# Patient Record
Sex: MALE | Race: WHITE | NOT HISPANIC OR LATINO | URBAN - METROPOLITAN AREA
[De-identification: names, ages, dates, MRNs, and addresses within clinical notes are randomized per-mention and may not be internally consistent; named-entity substitution may affect disease eponyms.]

---

## 2020-07-08 ENCOUNTER — OFFICE VISIT (OUTPATIENT)
Dept: URBAN - METROPOLITAN AREA CLINIC 63 | Facility: CLINIC | Age: 70
End: 2020-07-08

## 2022-07-09 ENCOUNTER — TELEPHONE ENCOUNTER (OUTPATIENT)
Dept: URBAN - METROPOLITAN AREA CLINIC 121 | Facility: CLINIC | Age: 72
End: 2022-07-09

## 2022-07-09 RX ORDER — HUMAN INSULIN 100 [USP'U]/ML
INJECTION, SUSPENSION SUBCUTANEOUS TAKE AS DIRECTED
Refills: 0 | OUTPATIENT
Start: 2019-06-24 | End: 2019-10-16

## 2022-07-09 RX ORDER — ASPIRIN 81 MG/1
TABLET, DELAYED RELEASE ORAL TAKE AS DIRECTED
Refills: 0 | OUTPATIENT
Start: 2019-06-24 | End: 2019-10-16

## 2022-07-09 RX ORDER — INSULIN GLARGINE 100 [IU]/ML
INJECTION, SOLUTION SUBCUTANEOUS TAKE AS DIRECTED
Refills: 0 | OUTPATIENT
Start: 2019-06-24 | End: 2019-10-16

## 2022-07-10 ENCOUNTER — TELEPHONE ENCOUNTER (OUTPATIENT)
Dept: URBAN - METROPOLITAN AREA CLINIC 121 | Facility: CLINIC | Age: 72
End: 2022-07-10

## 2022-07-10 RX ORDER — HUMAN INSULIN 100 [USP'U]/ML
INJECTION, SUSPENSION SUBCUTANEOUS TAKE AS DIRECTED
Refills: 0 | Status: ACTIVE | COMMUNITY
Start: 2019-10-16

## 2022-07-10 RX ORDER — ONDANSETRON HYDROCHLORIDE 4 MG/2ML
USE AS DIRECTED. TAKE 1 TABLET WITH START OF PREP AND 1 TABLET 4 HOURS LATER INJECTION, SOLUTION INTRAMUSCULAR; INTRAVENOUS
Refills: 0 | Status: ACTIVE | COMMUNITY
Start: 2019-06-24

## 2022-07-10 RX ORDER — INSULIN GLARGINE 100 [IU]/ML
INJECTION, SOLUTION SUBCUTANEOUS TAKE AS DIRECTED
Refills: 0 | Status: ACTIVE | COMMUNITY
Start: 2019-10-16

## 2022-07-10 RX ORDER — ASPIRIN 81 MG/1
TABLET, DELAYED RELEASE ORAL TAKE AS DIRECTED
Refills: 0 | Status: ACTIVE | COMMUNITY
Start: 2019-10-16

## 2022-07-10 RX ORDER — FAMOTIDINE 20 MG/1
TWICE A DAY TABLET ORAL TWICE A DAY
Refills: 3 | Status: ACTIVE | COMMUNITY
Start: 2019-10-16

## 2022-09-07 ENCOUNTER — P2P PATIENT RECORD (OUTPATIENT)
Age: 72
End: 2022-09-07

## 2022-09-12 ENCOUNTER — OFFICE VISIT (OUTPATIENT)
Dept: URBAN - METROPOLITAN AREA CLINIC 60 | Facility: CLINIC | Age: 72
End: 2022-09-12
Payer: OTHER GOVERNMENT

## 2022-09-12 ENCOUNTER — LAB OUTSIDE AN ENCOUNTER (OUTPATIENT)
Dept: URBAN - METROPOLITAN AREA CLINIC 60 | Facility: CLINIC | Age: 72
End: 2022-09-12

## 2022-09-12 VITALS
SYSTOLIC BLOOD PRESSURE: 140 MMHG | WEIGHT: 232.4 LBS | DIASTOLIC BLOOD PRESSURE: 76 MMHG | BODY MASS INDEX: 34.42 KG/M2 | HEIGHT: 69 IN | HEART RATE: 86 BPM | OXYGEN SATURATION: 95 % | TEMPERATURE: 98.6 F

## 2022-09-12 DIAGNOSIS — Z86.010 PERSONAL HISTORY OF COLONIC POLYPS: ICD-10-CM

## 2022-09-12 DIAGNOSIS — K76.0 FATTY LIVER: ICD-10-CM

## 2022-09-12 DIAGNOSIS — R13.19 OTHER DYSPHAGIA: ICD-10-CM

## 2022-09-12 DIAGNOSIS — R19.4 CHANGE IN BOWEL HABIT: ICD-10-CM

## 2022-09-12 PROCEDURE — 99213 OFFICE O/P EST LOW 20 MIN: CPT | Performed by: INTERNAL MEDICINE

## 2022-09-12 RX ORDER — INSULIN GLARGINE 100 [IU]/ML
INJECTION, SOLUTION SUBCUTANEOUS TAKE AS DIRECTED
Refills: 0 | Status: ACTIVE | COMMUNITY
Start: 2019-10-16

## 2022-09-12 RX ORDER — OMEPRAZOLE 20 MG/1
1 TABLET 30 MINUTES BEFORE MORNING MEAL TABLET, DELAYED RELEASE ORAL ONCE A DAY
Qty: 30 | Refills: 1 | OUTPATIENT
Start: 2022-09-12

## 2022-09-12 RX ORDER — ASPIRIN 81 MG/1
TABLET, DELAYED RELEASE ORAL TAKE AS DIRECTED
Refills: 0 | Status: ACTIVE | COMMUNITY
Start: 2019-10-16

## 2022-09-12 RX ORDER — HUMAN INSULIN 100 [USP'U]/ML
INJECTION, SUSPENSION SUBCUTANEOUS TAKE AS DIRECTED
Refills: 0 | Status: ACTIVE | COMMUNITY
Start: 2019-10-16

## 2022-09-12 RX ORDER — FAMOTIDINE 20 MG/1
TWICE A DAY TABLET ORAL TWICE A DAY
Refills: 3 | Status: ACTIVE | COMMUNITY
Start: 2019-10-16

## 2022-09-12 NOTE — HPI-TODAY'S VISIT:
Michael is a pleasant 71-year-old male who presents today for a change in bowel habits.  Patient discharged from the ER with acute chest pain status post PCI x3 on 8/23/2022. Patient relayed history of pancreatic surgery for previous pancreatic cyst done at [facility] but no record available for review.  Last visit noted bloating and reflux despite Zantac 150 milligrams twice daily.  Admitted to eating too fast.  Occasional BRBPR when wiping.  He was switched to famotidine 20 mg twice daily.  EGD dated 9/20/2019 demonstrated a regular Z line.  A single sessile polyp was found and biopsied at the gastroesophageal junction.  A small hiatal hernia was present.  Inflammation was found in the gastric antrum.  The remaining portion of the exam was otherwise unremarkable. Recommended EUS for further evaluation of duodenal nodule measuring 7 mm in the second portion of the duodenum.  Duodenitis in the second portion of the duodenum.  Pathology demonstrated benign mucosa demonstrating reactive changes with prominent underlying adipose tissue no definitive dysplasia or malignancy.  Benign mucosa with areas demonstrating reactive changes but no evidence of celiac disease.  Chronic gastritis and reactive changes negative for H. pylori.  GE junction mucosa benign.  No intestinal metaplasia.   Colonoscopy dated 9/20/2019 demonstrated one diminutive tubular adenoma removed from the mid sigmoid colon.  One diminutive tubular adenoma was removed from the mid transverse colon. One hyperplastic polyp removed from rectum. Nonbleeding internal hemorrhoids were present upon retroflexion and the remaining portion of the exam was otherwise unremarkable.  In light of patient's extremely difficult colonoscopy may consider virtual colonography when patient is due again for his surveillance modality.  Labs dated 8/26/2022 showed RBC 3.85, hemoglobin 12.8, hematocrit 38.  Platelets normal.  PT/INR normal.  Normal renal function.  Normal LFTs.  CT abdomen/pelvis with contrast dated 6/1/2022 showed diffuse hepatic steatosis.  Postsurgical changes of distal pancreatectomy.  Pancreatic head and uncinate process have normal morphology.  Simple subcentimeter right renal cysts which are not needed to follow-up  CT abdomen/pelvis with contrast dated 7/12/2022 showed cholelithiasis without cholecystitis.  Otherwise no acute abnormality.  Patient states over the past 2 mo more straining with bm .Occasional rectal bleeding.   Taking senna and miralax daily with some benefit. States after BM  abd pain relieved C/o dysphagia to solids over the past few weeks.   Does notice more heartburn as of late.  Denies any wt loss. Had cardiac stents placed 3 weeks ago. Takes brilinta and asa 81mg  daily.

## 2022-09-16 ENCOUNTER — OFFICE VISIT (OUTPATIENT)
Dept: URBAN - METROPOLITAN AREA CLINIC 63 | Facility: CLINIC | Age: 72
End: 2022-09-16

## 2022-10-24 ENCOUNTER — OFFICE VISIT (OUTPATIENT)
Dept: URBAN - METROPOLITAN AREA CLINIC 60 | Facility: CLINIC | Age: 72
End: 2022-10-24

## 2022-10-24 RX ORDER — FAMOTIDINE 20 MG/1
TWICE A DAY TABLET ORAL TWICE A DAY
Refills: 3 | Status: ACTIVE | COMMUNITY
Start: 2019-10-16

## 2022-10-24 RX ORDER — OMEPRAZOLE 20 MG/1
1 TABLET 30 MINUTES BEFORE MORNING MEAL TABLET, DELAYED RELEASE ORAL ONCE A DAY
Qty: 30 | Refills: 1 | Status: ACTIVE | COMMUNITY
Start: 2022-09-12

## 2022-10-24 RX ORDER — HUMAN INSULIN 100 [USP'U]/ML
INJECTION, SUSPENSION SUBCUTANEOUS TAKE AS DIRECTED
Refills: 0 | Status: ACTIVE | COMMUNITY
Start: 2019-10-16

## 2022-10-24 RX ORDER — INSULIN GLARGINE 100 [IU]/ML
INJECTION, SOLUTION SUBCUTANEOUS TAKE AS DIRECTED
Refills: 0 | Status: ACTIVE | COMMUNITY
Start: 2019-10-16

## 2022-10-24 RX ORDER — ASPIRIN 81 MG/1
TABLET, DELAYED RELEASE ORAL TAKE AS DIRECTED
Refills: 0 | Status: ACTIVE | COMMUNITY
Start: 2019-10-16

## 2022-11-17 ENCOUNTER — LAB OUTSIDE AN ENCOUNTER (OUTPATIENT)
Dept: URBAN - METROPOLITAN AREA CLINIC 63 | Facility: CLINIC | Age: 72
End: 2022-11-17

## 2022-11-17 ENCOUNTER — WEB ENCOUNTER (OUTPATIENT)
Dept: URBAN - METROPOLITAN AREA CLINIC 63 | Facility: CLINIC | Age: 72
End: 2022-11-17

## 2022-11-17 ENCOUNTER — OFFICE VISIT (OUTPATIENT)
Dept: URBAN - METROPOLITAN AREA CLINIC 63 | Facility: CLINIC | Age: 72
End: 2022-11-17
Payer: OTHER GOVERNMENT

## 2022-11-17 VITALS
SYSTOLIC BLOOD PRESSURE: 140 MMHG | HEIGHT: 69 IN | DIASTOLIC BLOOD PRESSURE: 70 MMHG | OXYGEN SATURATION: 95 % | BODY MASS INDEX: 34.39 KG/M2 | HEART RATE: 86 BPM | TEMPERATURE: 96.8 F | WEIGHT: 232.2 LBS

## 2022-11-17 DIAGNOSIS — K31.89 DUODENAL NODULE: ICD-10-CM

## 2022-11-17 DIAGNOSIS — K76.0 FATTY LIVER: ICD-10-CM

## 2022-11-17 DIAGNOSIS — R19.4 CHANGE IN BOWEL HABIT: ICD-10-CM

## 2022-11-17 DIAGNOSIS — R13.19 OTHER DYSPHAGIA: ICD-10-CM

## 2022-11-17 DIAGNOSIS — Z86.010 PERSONAL HISTORY OF COLONIC POLYPS: ICD-10-CM

## 2022-11-17 PROCEDURE — 99213 OFFICE O/P EST LOW 20 MIN: CPT | Performed by: INTERNAL MEDICINE

## 2022-11-17 RX ORDER — FAMOTIDINE 20 MG/1
TWICE A DAY TABLET ORAL TWICE A DAY
Refills: 3 | Status: ACTIVE | COMMUNITY
Start: 2019-10-16

## 2022-11-17 RX ORDER — OMEPRAZOLE 20 MG/1
1 TABLET 30 MINUTES BEFORE MORNING MEAL TABLET, DELAYED RELEASE ORAL ONCE A DAY
Qty: 30 | Refills: 1 | Status: ACTIVE | COMMUNITY
Start: 2022-09-12

## 2022-11-17 RX ORDER — LIDOCAINE 50 MG/G
1 APPLICATION AS NEEDED CREAM TOPICAL
Qty: 30 APPLICATOR | Refills: 0 | OUTPATIENT
Start: 2022-11-17 | End: 2022-12-17

## 2022-11-17 RX ORDER — POLYETHYLENE GLYCOL-3350, SODIUM CHLORIDE, POTASSIUM CHLORIDE AND SODIUM BICARBONATE 420; 11.2; 5.72; 1.48 G/438.4G; G/438.4G; G/438.4G; G/438.4G
AS DIRECTED POWDER, FOR SOLUTION ORAL
Qty: 420 MILLILITER | Refills: 0 | OUTPATIENT
Start: 2022-11-17 | End: 2022-11-18

## 2022-11-17 RX ORDER — ASPIRIN 81 MG/1
TABLET, DELAYED RELEASE ORAL TAKE AS DIRECTED
Refills: 0 | Status: ACTIVE | COMMUNITY
Start: 2019-10-16

## 2022-11-17 RX ORDER — ONDANSETRON HYDROCHLORIDE 4 MG/1
1 TABLET TABLET, FILM COATED ORAL
Qty: 2 | Refills: 0 | OUTPATIENT
Start: 2022-11-17

## 2022-11-17 RX ORDER — HUMAN INSULIN 100 [USP'U]/ML
INJECTION, SUSPENSION SUBCUTANEOUS TAKE AS DIRECTED
Refills: 0 | Status: ACTIVE | COMMUNITY
Start: 2019-10-16

## 2022-11-17 RX ORDER — INSULIN GLARGINE 100 [IU]/ML
INJECTION, SOLUTION SUBCUTANEOUS TAKE AS DIRECTED
Refills: 0 | Status: ACTIVE | COMMUNITY
Start: 2019-10-16

## 2022-11-17 NOTE — HPI-TODAY'S VISIT:
Michael is a pleasant 71-year-old male who presents today for follow up. Status post PCI x3 on 8/23/2022. Patient previously relayed history of pancreatic surgery for previous pancreatic cyst done at [facility] but no record available for review.  Last visit noted 2 months of straining with bowel movements and occasional rectal bleeding despite senna and MiraLAX daily.  Notes after bowel movement abdominal pain relieved.  Noted dysphagia to solids as well as more heartburn as of late.  History of cardiac stents recently placed taking Brilinta and ASA 81 mg daily.  He was started on omeprazole 20 mg daily and referred to colorectal surgery for evaluation of external hemorrhoids.  Advised high-fiber diet, Metamucil daily and continuing senna/MiraLAX as needed.  Blood work ordered last visit not completed.  FibroScan dated 10/13/2022 showed S2 and F0  Barium swallow dated 11/1/2022 showed no evidence for mucosal abnormalities.  No evidence for strictures.  Small sliding-type hiatal hernia.  Mild gastroesophageal reflux  EGD dated 9/20/2019 demonstrated a regular Z line.  A single sessile polyp was found and biopsied at the gastroesophageal junction.  A small hiatal hernia was present.  Inflammation was found in the gastric antrum.  The remaining portion of the exam was otherwise unremarkable. Recommended EUS for further evaluation of duodenal nodule measuring 7 mm in the second portion of the duodenum.  Duodenitis in the second portion of the duodenum.  Pathology demonstrated benign mucosa demonstrating reactive changes with prominent underlying adipose tissue no definitive dysplasia or malignancy.  Benign mucosa with areas demonstrating reactive changes but no evidence of celiac disease.  Chronic gastritis and reactive changes negative for H. pylori.  GE junction mucosa benign.  No intestinal metaplasia.   Colonoscopy dated 9/20/2019 demonstrated one diminutive tubular adenoma removed from the mid sigmoid colon.  One diminutive tubular adenoma was removed from the mid transverse colon. One hyperplastic polyp removed from rectum. Nonbleeding internal hemorrhoids were present upon retroflexion and the remaining portion of the exam was otherwise unremarkable.  In light of patient's extremely difficult colonoscopy may consider virtual colonography when patient is due again for his surveillance modality.  Labs dated 8/26/2022 showed RBC 3.85, hemoglobin 12.8, hematocrit 38.  Platelets normal.  PT/INR normal.  Normal renal function.  Normal LFTs.  CT abdomen/pelvis with contrast dated 6/1/2022 showed diffuse hepatic steatosis.  Postsurgical changes of distal pancreatectomy.  Pancreatic head and uncinate process have normal morphology.  Simple subcentimeter right renal cysts which are not needed to follow-up  CT abdomen/pelvis with contrast dated 7/12/2022 showed cholelithiasis without cholecystitis.  Otherwise no acute abnormality.  Patient states still with straining intermittently despite use of miralax , metamucil daily.    Occasional dyspepsia but denies dysphagia , wt loss.  C/O lower abd pain   Apparently scheduled [facility] order.  Pain is releived with BM.   Taking omeprazole 20mg daily  and famotidine 20mg though he is unclear on frequency .He dwaine call back office. Occasional rectal bleeding he attributes to hemorrhoids

## 2022-11-28 ENCOUNTER — TELEPHONE ENCOUNTER (OUTPATIENT)
Dept: URBAN - METROPOLITAN AREA CLINIC 63 | Facility: CLINIC | Age: 72
End: 2022-11-28

## 2022-12-22 ENCOUNTER — TELEPHONE ENCOUNTER (OUTPATIENT)
Dept: URBAN - METROPOLITAN AREA CLINIC 63 | Facility: CLINIC | Age: 72
End: 2022-12-22

## 2022-12-29 ENCOUNTER — TELEPHONE ENCOUNTER (OUTPATIENT)
Dept: URBAN - METROPOLITAN AREA CLINIC 63 | Facility: CLINIC | Age: 72
End: 2022-12-29

## 2023-01-10 ENCOUNTER — TELEPHONE ENCOUNTER (OUTPATIENT)
Dept: URBAN - METROPOLITAN AREA CLINIC 63 | Facility: CLINIC | Age: 73
End: 2023-01-10

## 2023-03-05 PROBLEM — 428283002: Status: ACTIVE | Noted: 2022-09-12

## 2023-03-05 PROBLEM — 197321007: Status: ACTIVE | Noted: 2022-09-12

## 2023-03-06 ENCOUNTER — OFFICE VISIT (OUTPATIENT)
Dept: URBAN - METROPOLITAN AREA CLINIC 60 | Facility: CLINIC | Age: 73
End: 2023-03-06

## 2023-03-06 RX ORDER — ASPIRIN 81 MG/1
TABLET, DELAYED RELEASE ORAL TAKE AS DIRECTED
Refills: 0 | Status: ACTIVE | COMMUNITY
Start: 2019-10-16

## 2023-03-06 RX ORDER — INSULIN GLARGINE 100 [IU]/ML
INJECTION, SOLUTION SUBCUTANEOUS TAKE AS DIRECTED
Refills: 0 | Status: ACTIVE | COMMUNITY
Start: 2019-10-16

## 2023-03-06 RX ORDER — OMEPRAZOLE 20 MG/1
1 TABLET 30 MINUTES BEFORE MORNING MEAL TABLET, DELAYED RELEASE ORAL ONCE A DAY
Qty: 30 | Refills: 1 | Status: ACTIVE | COMMUNITY
Start: 2022-09-12

## 2023-03-06 RX ORDER — ONDANSETRON HYDROCHLORIDE 4 MG/1
1 TABLET TABLET, FILM COATED ORAL
Qty: 2 | Refills: 0 | Status: ACTIVE | COMMUNITY
Start: 2022-11-17

## 2023-03-06 RX ORDER — FAMOTIDINE 20 MG/1
TWICE A DAY TABLET ORAL TWICE A DAY
Refills: 3 | Status: ACTIVE | COMMUNITY
Start: 2019-10-16

## 2023-03-06 RX ORDER — HUMAN INSULIN 100 [USP'U]/ML
INJECTION, SUSPENSION SUBCUTANEOUS TAKE AS DIRECTED
Refills: 0 | Status: ACTIVE | COMMUNITY
Start: 2019-10-16

## 2023-03-06 NOTE — HPI-TODAY'S VISIT:
Michael is a pleasant 72-year-old male who presents today for follow up. Status post PCI x3 on 8/23/2022. History of cardiac stents recently placed taking Brilinta and ASA 81 mg daily.  Referred to colorectal surgery for evaluation of external hemorrhoids.  Last visit noted straining intermittently despite use of MiraLAX and Metamucil daily.  Occasional dyspepsia.  No abdominal pain.  Pain is relieved with bowel movement.  Taking omeprazole 20 mg daily and famotidine 20 mg daily but he is unclear on frequency.  Occasional rectal bleeding he attributes to hemorrhoids.  Given RectiCare cream.  Referred to Dr. Grace.  Repeat FibroScan due in October 2023.  Labs ordered last visit not completed. Plan is to perform  EUS once able to be cleared by cardiology.  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  Postop complicated by pseudocyst.  Underwent CT abdomen/pelvis with contrast dated 12/1/2022 showed gallstones.  Relatively diffuse spasm of the sigmoid colon suggestive of slight wall thickening.  Spasm may be incidental or due to mild inflammatory process such as colitis.  Urinary bladder appears full  CT virtual colonography dated 12/19/2022 showed a 5 mm polyp in the sigmoid colon and transverse colon.  No additional polyp stricture or mass.  Colon is tortuous.  Recommend CT colonoscopy in 3 years.  Cholelithiasis.  FibroScan dated 10/13/2022 showed S2 and F0  Barium swallow dated 11/1/2022 showed no evidence for mucosal abnormalities.  No evidence for strictures.  Small sliding-type hiatal hernia.  Mild gastroesophageal reflux  EGD dated 9/20/2019 demonstrated a regular Z line.  A single sessile polyp was found and biopsied at the gastroesophageal junction.  A small hiatal hernia was present.  Inflammation was found in the gastric antrum.  The remaining portion of the exam was otherwise unremarkable. Recommended EUS for further evaluation of duodenal nodule measuring 7 mm in the second portion of the duodenum.  Duodenitis in the second portion of the duodenum.  Pathology demonstrated benign mucosa demonstrating reactive changes with prominent underlying adipose tissue no definitive dysplasia or malignancy.  Benign mucosa with areas demonstrating reactive changes but no evidence of celiac disease.  Chronic gastritis and reactive changes negative for H. pylori.  GE junction mucosa benign.  No intestinal metaplasia.   Colonoscopy dated 9/20/2019 demonstrated one diminutive tubular adenoma removed from the mid sigmoid colon.  One diminutive tubular adenoma was removed from the mid transverse colon. One hyperplastic polyp removed from rectum. Nonbleeding internal hemorrhoids were present upon retroflexion and the remaining portion of the exam was otherwise unremarkable.  In light of patient's extremely difficult colonoscopy may consider virtual colonography when patient is due again for his surveillance modality.  Labs dated 8/26/2022 showed RBC 3.85, hemoglobin 12.8, hematocrit 38.  Platelets normal.  PT/INR normal.  Normal renal function.  Normal LFTs.  CT abdomen/pelvis with contrast dated 6/1/2022 showed diffuse hepatic steatosis.  Postsurgical changes of distal pancreatectomy.  Pancreatic head and uncinate process have normal morphology.  Simple subcentimeter right renal cysts which are not needed to follow-up

## 2023-03-15 ENCOUNTER — OFFICE VISIT (OUTPATIENT)
Dept: URBAN - METROPOLITAN AREA CLINIC 63 | Facility: CLINIC | Age: 73
End: 2023-03-15
Payer: OTHER GOVERNMENT

## 2023-03-15 VITALS
OXYGEN SATURATION: 95 % | DIASTOLIC BLOOD PRESSURE: 70 MMHG | HEART RATE: 84 BPM | BODY MASS INDEX: 33.86 KG/M2 | SYSTOLIC BLOOD PRESSURE: 130 MMHG | TEMPERATURE: 96.8 F | HEIGHT: 69 IN | WEIGHT: 228.6 LBS

## 2023-03-15 DIAGNOSIS — R19.4 CHANGE IN BOWEL HABIT: ICD-10-CM

## 2023-03-15 DIAGNOSIS — K76.0 FATTY LIVER: ICD-10-CM

## 2023-03-15 DIAGNOSIS — Z86.010 PERSONAL HISTORY OF COLONIC POLYPS: ICD-10-CM

## 2023-03-15 DIAGNOSIS — D64.89 ANEMIA DUE TO OTHER CAUSE, NOT CLASSIFIED: ICD-10-CM

## 2023-03-15 DIAGNOSIS — K31.89 DUODENAL NODULE: ICD-10-CM

## 2023-03-15 PROCEDURE — 99213 OFFICE O/P EST LOW 20 MIN: CPT | Performed by: INTERNAL MEDICINE

## 2023-03-15 RX ORDER — HUMAN INSULIN 100 [USP'U]/ML
INJECTION, SUSPENSION SUBCUTANEOUS TAKE AS DIRECTED
Refills: 0 | Status: ACTIVE | COMMUNITY
Start: 2019-10-16

## 2023-03-15 RX ORDER — ONDANSETRON HYDROCHLORIDE 4 MG/1
1 TABLET TABLET, FILM COATED ORAL
Qty: 2 | Refills: 0 | Status: ACTIVE | COMMUNITY
Start: 2022-11-17

## 2023-03-15 RX ORDER — ASPIRIN 81 MG/1
TABLET, DELAYED RELEASE ORAL TAKE AS DIRECTED
Refills: 0 | Status: ACTIVE | COMMUNITY
Start: 2019-10-16

## 2023-03-15 RX ORDER — OMEPRAZOLE 20 MG/1
1 TABLET 30 MINUTES BEFORE MORNING MEAL TABLET, DELAYED RELEASE ORAL ONCE A DAY
Qty: 30 | Refills: 1 | Status: ACTIVE | COMMUNITY
Start: 2022-09-12

## 2023-03-15 RX ORDER — INSULIN GLARGINE 100 [IU]/ML
INJECTION, SOLUTION SUBCUTANEOUS TAKE AS DIRECTED
Refills: 0 | Status: ACTIVE | COMMUNITY
Start: 2019-10-16

## 2023-03-15 RX ORDER — FAMOTIDINE 20 MG/1
TWICE A DAY TABLET ORAL TWICE A DAY
Refills: 3 | Status: ACTIVE | COMMUNITY
Start: 2019-10-16

## 2023-05-24 ENCOUNTER — TELEPHONE ENCOUNTER (OUTPATIENT)
Dept: URBAN - METROPOLITAN AREA CLINIC 63 | Facility: CLINIC | Age: 73
End: 2023-05-24

## 2023-05-25 ENCOUNTER — OFFICE VISIT (OUTPATIENT)
Dept: URBAN - METROPOLITAN AREA CLINIC 63 | Facility: CLINIC | Age: 73
End: 2023-05-25

## 2023-05-25 RX ORDER — HUMAN INSULIN 100 [USP'U]/ML
INJECTION, SUSPENSION SUBCUTANEOUS TAKE AS DIRECTED
Refills: 0 | Status: ACTIVE | COMMUNITY
Start: 2019-10-16

## 2023-05-25 RX ORDER — ASPIRIN 81 MG/1
TABLET, DELAYED RELEASE ORAL TAKE AS DIRECTED
Refills: 0 | Status: ACTIVE | COMMUNITY
Start: 2019-10-16

## 2023-05-25 RX ORDER — ONDANSETRON HYDROCHLORIDE 4 MG/1
1 TABLET TABLET, FILM COATED ORAL
Qty: 2 | Refills: 0 | Status: ACTIVE | COMMUNITY
Start: 2022-11-17

## 2023-05-25 RX ORDER — FAMOTIDINE 20 MG/1
TWICE A DAY TABLET ORAL TWICE A DAY
Refills: 3 | Status: ACTIVE | COMMUNITY
Start: 2019-10-16

## 2023-05-25 RX ORDER — OMEPRAZOLE 20 MG/1
1 TABLET 30 MINUTES BEFORE MORNING MEAL TABLET, DELAYED RELEASE ORAL ONCE A DAY
Qty: 30 | Refills: 1 | Status: ACTIVE | COMMUNITY
Start: 2022-09-12

## 2023-05-25 RX ORDER — INSULIN GLARGINE 100 [IU]/ML
INJECTION, SOLUTION SUBCUTANEOUS TAKE AS DIRECTED
Refills: 0 | Status: ACTIVE | COMMUNITY
Start: 2019-10-16

## 2023-05-25 NOTE — HPI-TODAY'S VISIT:
Michael is a pleasant 72-year-old male who presents today for follow up. Status post PCI x3 on 8/23/2022. History of cardiac stents taking Brilinta and ASA 81 mg daily.  Referred to colorectal surgery for evaluation of external hemorrhoids. Records from 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  Postop complicated by pseudocyst. Labs ordered last visit not completed.  Last visit noted occasional straining with bowel movements and rectal bleeding.  Lower abdominal pain intermittently relieved with bowel movement.  Taking Metamucil as needed.  Noted more heartburn as of late.  Taking Gaviscon twice daily and antiacid which he did not recall the name of.  Recommended he continue Metamucil daily.  Plan is to perform EGD/EUS once off Brilinta  FibroScan performed with Dr. Grace dated 3/28/2023 showed CAP score 291 and 8.0 kPa.  Concerning for fatty liver.  Recommended FibroScan 12 months from prior study.  Advised of lifestyle modifications.  Does not currently qualify for clinical trial therapy.  Normal LFTs.  Underwent CT abdomen/pelvis with contrast dated 12/1/2022 showed gallstones.  Relatively diffuse spasm of the sigmoid colon suggestive of slight wall thickening.  Spasm may be incidental or due to mild inflammatory process such as colitis.  Urinary bladder appears full  CT virtual colonography dated 12/19/2022 showed a 5 mm polyp in the sigmoid colon and transverse colon.  No additional polyp stricture or mass.  Colon is tortuous.  Recommend CT colonoscopy in 3 years.  Cholelithiasis.  FibroScan dated 10/13/2022 showed S2 and F0  Barium swallow dated 11/1/2022 showed no evidence for mucosal abnormalities.  No evidence for strictures.  Small sliding-type hiatal hernia.  Mild gastroesophageal reflux  EGD dated 9/20/2019 demonstrated a regular Z line.  A single sessile polyp was found and biopsied at the gastroesophageal junction.  A small hiatal hernia was present.  Inflammation was found in the gastric antrum.  The remaining portion of the exam was otherwise unremarkable. Recommended EUS for further evaluation of duodenal nodule measuring 7 mm in the second portion of the duodenum.  Duodenitis in the second portion of the duodenum.  Pathology demonstrated benign mucosa demonstrating reactive changes with prominent underlying adipose tissue no definitive dysplasia or malignancy.  Benign mucosa with areas demonstrating reactive changes but no evidence of celiac disease.  Chronic gastritis and reactive changes negative for H. pylori.  GE junction mucosa benign.  No intestinal metaplasia.   Colonoscopy dated 9/20/2019 demonstrated one diminutive tubular adenoma removed from the mid sigmoid colon.  One diminutive tubular adenoma was removed from the mid transverse colon. One hyperplastic polyp removed from rectum. Nonbleeding internal hemorrhoids were present upon retroflexion and the remaining portion of the exam was otherwise unremarkable.  In light of patient's extremely difficult colonoscopy may consider virtual colonography when patient is due again for his surveillance modality.  Labs dated 8/26/2022 showed RBC 3.85, hemoglobin 12.8, hematocrit 38.  Platelets normal.  PT/INR normal.  Normal renal function.  Normal LFTs.  CT abdomen/pelvis with contrast dated 6/1/2022 showed diffuse hepatic steatosis.  Postsurgical changes of distal pancreatectomy.  Pancreatic head and uncinate process have normal morphology.  Simple subcentimeter right renal cysts which are not needed to follow-up

## 2023-05-31 ENCOUNTER — OFFICE VISIT (OUTPATIENT)
Dept: URBAN - METROPOLITAN AREA CLINIC 63 | Facility: CLINIC | Age: 73
End: 2023-05-31
Payer: OTHER GOVERNMENT

## 2023-05-31 VITALS
BODY MASS INDEX: 34.57 KG/M2 | HEART RATE: 80 BPM | DIASTOLIC BLOOD PRESSURE: 80 MMHG | TEMPERATURE: 97.5 F | WEIGHT: 233.4 LBS | HEIGHT: 69 IN | OXYGEN SATURATION: 96 % | SYSTOLIC BLOOD PRESSURE: 130 MMHG

## 2023-05-31 DIAGNOSIS — R19.4 CHANGE IN BOWEL HABIT: ICD-10-CM

## 2023-05-31 DIAGNOSIS — K76.0 FATTY LIVER: ICD-10-CM

## 2023-05-31 DIAGNOSIS — R10.30 LOWER ABDOMINAL PAIN: ICD-10-CM

## 2023-05-31 DIAGNOSIS — Z86.010 PERSONAL HISTORY OF COLONIC POLYPS: ICD-10-CM

## 2023-05-31 PROCEDURE — 99213 OFFICE O/P EST LOW 20 MIN: CPT | Performed by: INTERNAL MEDICINE

## 2023-05-31 RX ORDER — TAMSULOSIN HYDROCHLORIDE 0.4 MG/1
1 CAPSULE CAPSULE ORAL ONCE A DAY
Status: ACTIVE | COMMUNITY

## 2023-05-31 RX ORDER — LOSARTAN POTASSIUM 25 MG/1
1 TABLET TABLET ORAL ONCE A DAY
Status: ACTIVE | COMMUNITY

## 2023-05-31 RX ORDER — HUMAN INSULIN 100 [USP'U]/ML
INJECTION, SUSPENSION SUBCUTANEOUS TAKE AS DIRECTED
Refills: 0 | Status: ACTIVE | COMMUNITY
Start: 2019-10-16

## 2023-05-31 RX ORDER — FUROSEMIDE 40 MG/1
1 TABLET TABLET ORAL ONCE A DAY
Status: ACTIVE | COMMUNITY

## 2023-05-31 RX ORDER — ASPIRIN 81 MG/1
TABLET, DELAYED RELEASE ORAL TAKE AS DIRECTED
Refills: 0 | Status: ACTIVE | COMMUNITY
Start: 2019-10-16

## 2023-05-31 RX ORDER — TRAZODONE HYDROCHLORIDE 50 MG/1
1 TABLET AT BEDTIME AS NEEDED TABLET ORAL ONCE A DAY
Status: ACTIVE | COMMUNITY

## 2023-05-31 RX ORDER — DICYCLOMINE HYDROCHLORIDE 10 MG/1
1 CAPSULE 30 MINUTES BEFORE EATING CAPSULE ORAL 2 TIMES DAILY
Qty: 60 | Refills: 3 | OUTPATIENT
Start: 2023-05-31 | End: 2023-09-28

## 2023-05-31 RX ORDER — CETIRIZINE HYDROCHLORIDE 10 MG/1
1 TABLET TABLET, FILM COATED ORAL ONCE A DAY
Status: ACTIVE | COMMUNITY

## 2023-05-31 RX ORDER — METOCLOPRAMIDE 10 MG/1
1 TABLET BEFORE MEALS TABLET ORAL TWICE A DAY
Status: ACTIVE | COMMUNITY

## 2023-05-31 RX ORDER — LIDOCAINE 50 MG/G
1 APPLICATION AS NEEDED OINTMENT TOPICAL THREE TIMES A DAY
Status: ACTIVE | COMMUNITY

## 2023-05-31 RX ORDER — INSULIN GLARGINE 100 [IU]/ML
INJECTION, SOLUTION SUBCUTANEOUS TAKE AS DIRECTED
Refills: 0 | Status: ACTIVE | COMMUNITY
Start: 2019-10-16

## 2023-05-31 RX ORDER — METOPROLOL TARTRATE 25 MG/1
1 TABLET WITH FOOD TABLET, FILM COATED ORAL TWICE A DAY
Status: ACTIVE | COMMUNITY

## 2023-05-31 RX ORDER — POLYETHYLENE GLYCOL 3350 17 G/17G
1 PACKET MIXED WITH 8 OUNCES OF FLUID POWDER, FOR SOLUTION ORAL ONCE A DAY
Status: ACTIVE | COMMUNITY

## 2023-05-31 RX ORDER — FAMOTIDINE 20 MG/1
TWICE A DAY TABLET ORAL TWICE A DAY
Refills: 3 | Status: ACTIVE | COMMUNITY
Start: 2019-10-16

## 2023-05-31 RX ORDER — ATORVASTATIN CALCIUM 80 MG/1
1 TABLET TABLET, FILM COATED ORAL ONCE A DAY
Status: ACTIVE | COMMUNITY

## 2023-05-31 RX ORDER — FLUOXETINE 20 MG/1
1 CAPSULE CAPSULE ORAL ONCE A DAY
Status: ACTIVE | COMMUNITY

## 2023-05-31 RX ORDER — OMEPRAZOLE 20 MG/1
1 TABLET 30 MINUTES BEFORE MORNING MEAL TABLET, DELAYED RELEASE ORAL ONCE A DAY
Qty: 30 | Refills: 1 | Status: ACTIVE | COMMUNITY
Start: 2022-09-12

## 2023-05-31 RX ORDER — GABAPENTIN 300 MG/1
1 CAPSULE CAPSULE ORAL ONCE A DAY
Status: ACTIVE | COMMUNITY

## 2023-05-31 NOTE — HPI-TODAY'S VISIT:
Michael is a pleasant 72-year-old male who presents today for follow up. Status post PCI x3 on 8/23/2022. History of cardiac stents taking Brilinta and ASA 81 mg daily.  Referred to colorectal surgery for evaluation of external hemorrhoids. 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  Postop complicated by pseudocyst. Labs ordered last visit not completed.  Last visit noted occasional straining with bowel movements and rectal bleeding.  Lower abdominal pain intermittently relieved with bowel movement.  Taking Metamucil as needed.  Noted more heartburn as of late.  Taking Gaviscon twice daily and antiacid which he did not recall the name of.  Recommended he continue Metamucil daily.  Plan is to perform EGD/EUS once off Brilinta  FibroScan performed with Dr. Grace dated 3/28/2023 showed CAP score 291 and 8.0 kPa.  Concerning for fatty liver.  Recommended FibroScan 12 months from prior study.  Advised of lifestyle modifications.  Does not currently qualify for clinical trial therapy.  Normal LFTs.  Underwent CT abdomen/pelvis with contrast dated 12/1/2022 showed gallstones.  Relatively diffuse spasm of the sigmoid colon suggestive of slight wall thickening.  Spasm may be incidental or due to mild inflammatory process such as colitis.  Urinary bladder appears full  CT virtual colonography dated 12/19/2022 showed a 5 mm polyp in the sigmoid colon and transverse colon.  No additional polyp stricture or mass.  Colon is tortuous.  Recommend CT colonoscopy in 3 years.  Cholelithiasis.  FibroScan dated 10/13/2022 showed S2 and F0  Barium swallow dated 11/1/2022 showed no evidence for mucosal abnormalities.  No evidence for strictures.  Small sliding-type hiatal hernia.  Mild gastroesophageal reflux  EGD dated 9/20/2019 demonstrated a regular Z line.  A single sessile polyp was found and biopsied at the gastroesophageal junction.  A small hiatal hernia was present.  Inflammation was found in the gastric antrum.  The remaining portion of the exam was otherwise unremarkable. Recommended EUS for further evaluation of duodenal nodule measuring 7 mm in the second portion of the duodenum.  Duodenitis in the second portion of the duodenum.  Pathology demonstrated benign mucosa demonstrating reactive changes with prominent underlying adipose tissue no definitive dysplasia or malignancy.  Benign mucosa with areas demonstrating reactive changes but no evidence of celiac disease.  Chronic gastritis and reactive changes negative for H. pylori.  GE junction mucosa benign.  No intestinal metaplasia.   Colonoscopy dated 9/20/2019 demonstrated one diminutive tubular adenoma removed from the mid sigmoid colon.  One diminutive tubular adenoma was removed from the mid transverse colon. One hyperplastic polyp removed from rectum. Nonbleeding internal hemorrhoids were present upon retroflexion and the remaining portion of the exam was otherwise unremarkable.  In light of patient's extremely difficult colonoscopy may consider virtual colonography when patient is due again for his surveillance modality.  Labs dated 8/26/2022 showed RBC 3.85, hemoglobin 12.8, hematocrit 38.  Platelets normal.  PT/INR normal.  Normal renal function.  Normal LFTs.  CT abdomen/pelvis with contrast dated 6/1/2022 showed diffuse hepatic steatosis.  Postsurgical changes of distal pancreatectomy.  Pancreatic head and uncinate process have normal morphology.  Simple subcentimeter right renal cysts which are not needed to follow-up  Labs 5/4 reveal normal hgb, lfts and renal fxn  Patient states lower abd pain still present , though relieved with BM.  Takes miralax  and metamucil daily . States when having more bowel regularity abd pain is lessened.  One episode of rectal bleeding 3 weeks ago.Denies wt loss. Denies heartburn , dysphagia .States will be off brilinta on Sept.  Admits to frequent  passage of flatus. Takes bentyl 10mg daily

## 2023-06-01 ENCOUNTER — OFFICE VISIT (OUTPATIENT)
Dept: URBAN - METROPOLITAN AREA CLINIC 63 | Facility: CLINIC | Age: 73
End: 2023-06-01

## 2023-06-01 RX ORDER — HUMAN INSULIN 100 [USP'U]/ML
INJECTION, SUSPENSION SUBCUTANEOUS TAKE AS DIRECTED
Refills: 0 | COMMUNITY
Start: 2019-10-16

## 2023-06-01 RX ORDER — OMEPRAZOLE 20 MG/1
1 TABLET 30 MINUTES BEFORE MORNING MEAL TABLET, DELAYED RELEASE ORAL ONCE A DAY
Qty: 30 | Refills: 1 | COMMUNITY
Start: 2022-09-12

## 2023-06-01 RX ORDER — INSULIN GLARGINE 100 [IU]/ML
INJECTION, SOLUTION SUBCUTANEOUS TAKE AS DIRECTED
Refills: 0 | COMMUNITY
Start: 2019-10-16

## 2023-06-01 RX ORDER — FAMOTIDINE 20 MG/1
TWICE A DAY TABLET ORAL TWICE A DAY
Refills: 3 | COMMUNITY
Start: 2019-10-16

## 2023-06-01 RX ORDER — ASPIRIN 81 MG/1
TABLET, DELAYED RELEASE ORAL TAKE AS DIRECTED
Refills: 0 | COMMUNITY
Start: 2019-10-16

## 2023-06-02 ENCOUNTER — P2P PATIENT RECORD (OUTPATIENT)
Age: 73
End: 2023-06-02

## 2023-06-15 ENCOUNTER — OFFICE VISIT (OUTPATIENT)
Dept: URBAN - METROPOLITAN AREA CLINIC 63 | Facility: CLINIC | Age: 73
End: 2023-06-15

## 2023-08-28 ENCOUNTER — OFFICE VISIT (OUTPATIENT)
Dept: URBAN - METROPOLITAN AREA CLINIC 60 | Facility: CLINIC | Age: 73
End: 2023-08-28

## 2023-08-28 RX ORDER — LIDOCAINE 50 MG/G
1 APPLICATION AS NEEDED OINTMENT TOPICAL THREE TIMES A DAY
Status: ACTIVE | COMMUNITY

## 2023-08-28 RX ORDER — TAMSULOSIN HYDROCHLORIDE 0.4 MG/1
1 CAPSULE CAPSULE ORAL ONCE A DAY
Status: ACTIVE | COMMUNITY

## 2023-08-28 RX ORDER — DICYCLOMINE HYDROCHLORIDE 10 MG/1
1 CAPSULE 30 MINUTES BEFORE EATING CAPSULE ORAL 2 TIMES DAILY
Qty: 60 | Refills: 3 | Status: ACTIVE | COMMUNITY
Start: 2023-05-31 | End: 2023-09-28

## 2023-08-28 RX ORDER — TRAZODONE HYDROCHLORIDE 50 MG/1
1 TABLET AT BEDTIME AS NEEDED TABLET ORAL ONCE A DAY
Status: ACTIVE | COMMUNITY

## 2023-08-28 RX ORDER — METOCLOPRAMIDE 10 MG/1
1 TABLET BEFORE MEALS TABLET ORAL TWICE A DAY
Status: ACTIVE | COMMUNITY

## 2023-08-28 RX ORDER — FLUOXETINE 20 MG/1
1 CAPSULE CAPSULE ORAL ONCE A DAY
Status: ACTIVE | COMMUNITY

## 2023-08-28 RX ORDER — ASPIRIN 81 MG/1
TABLET, DELAYED RELEASE ORAL TAKE AS DIRECTED
Refills: 0 | COMMUNITY
Start: 2019-10-16

## 2023-08-28 RX ORDER — CETIRIZINE HYDROCHLORIDE 10 MG/1
1 TABLET TABLET, FILM COATED ORAL ONCE A DAY
Status: ACTIVE | COMMUNITY

## 2023-08-28 RX ORDER — OMEPRAZOLE 20 MG/1
1 TABLET 30 MINUTES BEFORE MORNING MEAL TABLET, DELAYED RELEASE ORAL ONCE A DAY
Qty: 30 | Refills: 1 | COMMUNITY
Start: 2022-09-12

## 2023-08-28 RX ORDER — GABAPENTIN 300 MG/1
1 CAPSULE CAPSULE ORAL ONCE A DAY
Status: ACTIVE | COMMUNITY

## 2023-08-28 RX ORDER — METOPROLOL TARTRATE 25 MG/1
1 TABLET WITH FOOD TABLET, FILM COATED ORAL TWICE A DAY
Status: ACTIVE | COMMUNITY

## 2023-08-28 RX ORDER — FUROSEMIDE 40 MG/1
1 TABLET TABLET ORAL ONCE A DAY
Status: ACTIVE | COMMUNITY

## 2023-08-28 RX ORDER — POLYETHYLENE GLYCOL 3350 17 G/17G
1 PACKET MIXED WITH 8 OUNCES OF FLUID POWDER, FOR SOLUTION ORAL ONCE A DAY
Status: ACTIVE | COMMUNITY

## 2023-08-28 RX ORDER — INSULIN GLARGINE 100 [IU]/ML
INJECTION, SOLUTION SUBCUTANEOUS TAKE AS DIRECTED
Refills: 0 | COMMUNITY
Start: 2019-10-16

## 2023-08-28 RX ORDER — HUMAN INSULIN 100 [USP'U]/ML
INJECTION, SUSPENSION SUBCUTANEOUS TAKE AS DIRECTED
Refills: 0 | COMMUNITY
Start: 2019-10-16

## 2023-08-28 RX ORDER — LOSARTAN POTASSIUM 25 MG/1
1 TABLET TABLET ORAL ONCE A DAY
Status: ACTIVE | COMMUNITY

## 2023-08-28 RX ORDER — FAMOTIDINE 20 MG/1
TWICE A DAY TABLET ORAL TWICE A DAY
Refills: 3 | COMMUNITY
Start: 2019-10-16

## 2023-08-28 RX ORDER — ATORVASTATIN CALCIUM 80 MG/1
1 TABLET TABLET, FILM COATED ORAL ONCE A DAY
Status: ACTIVE | COMMUNITY

## 2023-08-28 NOTE — HPI-TODAY'S VISIT:
Michael is a pleasant 72-year-old male who presents today for a follow up. Status post PCI x3 on 8/23/2022. History of cardiac stents taking Brilinta and ASA 81 mg daily.  Referred to colorectal surgery for evaluation of external hemorrhoids. 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  Postop complicated by pseudocyst. Plan is to perform EGD/EUS once off Brilinta. Last visit noted lower abdominal pain still present though relieved with bowel movement.  Taking MiraLAX and Metamucil daily.  States when he has more bowel regularity abdominal pain is lessened.  1 episode of rectal bleeding 3 weeks prior to his previous office visit.  He stated he will be off the Brilinta in September.  Admitted to frequent passage of flatus.  Taking Bentyl 10 mg daily.  Recommend he continue current regimen in addition to low FODMAPs diet and high-fiber diet  Labs dated 5/4/23 showed a normal hemoglobin.  Normal LFTs.  Normal renal function  FibroScan performed with Dr. Grace dated 3/28/2023 showed CAP score 291 and 8.0 kPa.  Concerning for fatty liver.  Recommended FibroScan 12 months from prior study.  Advised of lifestyle modifications.  Does not currently qualify for clinical trial therapy.  Normal LFTs.  Underwent CT abdomen/pelvis with contrast dated 12/1/2022 showed gallstones.  Relatively diffuse spasm of the sigmoid colon suggestive of slight wall thickening.  Spasm may be incidental or due to mild inflammatory process such as colitis.  Urinary bladder appears full  CT virtual colonography dated 12/19/2022 showed a 5 mm polyp in the sigmoid colon and transverse colon.  No additional polyp stricture or mass.  Colon is tortuous.  Recommend CT colonoscopy in 3 years.  Cholelithiasis.  FibroScan dated 10/13/2022 showed S2 and F0  Barium swallow dated 11/1/2022 showed no evidence for mucosal abnormalities.  No evidence for strictures.  Small sliding-type hiatal hernia.  Mild gastroesophageal reflux  EGD dated 9/20/2019 demonstrated a regular Z line.  A single sessile polyp was found and biopsied at the gastroesophageal junction.  A small hiatal hernia was present.  Inflammation was found in the gastric antrum.  The remaining portion of the exam was otherwise unremarkable. Recommended EUS for further evaluation of duodenal nodule measuring 7 mm in the second portion of the duodenum.  Duodenitis in the second portion of the duodenum.  Pathology demonstrated benign mucosa demonstrating reactive changes with prominent underlying adipose tissue no definitive dysplasia or malignancy.  Benign mucosa with areas demonstrating reactive changes but no evidence of celiac disease.  Chronic gastritis and reactive changes negative for H. pylori.  GE junction mucosa benign.  No intestinal metaplasia.   Colonoscopy dated 9/20/2019 demonstrated one diminutive tubular adenoma removed from the mid sigmoid colon.  One diminutive tubular adenoma was removed from the mid transverse colon. One hyperplastic polyp removed from rectum. Nonbleeding internal hemorrhoids were present upon retroflexion and the remaining portion of the exam was otherwise unremarkable.  In light of patient's extremely difficult colonoscopy may consider virtual colonography when patient is due again for his surveillance modality.  CT abdomen/pelvis with contrast dated 6/1/2022 showed diffuse hepatic steatosis.  Postsurgical changes of distal pancreatectomy.  Pancreatic head and uncinate process have normal morphology.  Simple subcentimeter right renal cysts which are not needed to follow-up

## 2023-10-09 ENCOUNTER — OFFICE VISIT (OUTPATIENT)
Dept: URBAN - METROPOLITAN AREA CLINIC 60 | Facility: CLINIC | Age: 73
End: 2023-10-09

## 2023-10-11 ENCOUNTER — LAB OUTSIDE AN ENCOUNTER (OUTPATIENT)
Dept: URBAN - METROPOLITAN AREA CLINIC 63 | Facility: CLINIC | Age: 73
End: 2023-10-11

## 2023-10-11 ENCOUNTER — OFFICE VISIT (OUTPATIENT)
Dept: URBAN - METROPOLITAN AREA CLINIC 63 | Facility: CLINIC | Age: 73
End: 2023-10-11
Payer: OTHER GOVERNMENT

## 2023-10-11 VITALS
DIASTOLIC BLOOD PRESSURE: 70 MMHG | SYSTOLIC BLOOD PRESSURE: 140 MMHG | HEART RATE: 81 BPM | BODY MASS INDEX: 34.75 KG/M2 | WEIGHT: 234.6 LBS | HEIGHT: 69 IN | OXYGEN SATURATION: 91 % | TEMPERATURE: 97.5 F

## 2023-10-11 DIAGNOSIS — K31.89 DUODENAL NODULE: ICD-10-CM

## 2023-10-11 DIAGNOSIS — R10.30 LOWER ABDOMINAL PAIN: ICD-10-CM

## 2023-10-11 DIAGNOSIS — R13.19 ESOPHAGEAL DYSPHAGIA: ICD-10-CM

## 2023-10-11 DIAGNOSIS — R19.4 CHANGE IN BOWEL HABIT: ICD-10-CM

## 2023-10-11 DIAGNOSIS — Z86.010 PERSONAL HISTORY OF COLONIC POLYPS: ICD-10-CM

## 2023-10-11 DIAGNOSIS — K76.0 FATTY LIVER: ICD-10-CM

## 2023-10-11 PROCEDURE — 99214 OFFICE O/P EST MOD 30 MIN: CPT | Performed by: INTERNAL MEDICINE

## 2023-10-11 RX ORDER — POLYETHYLENE GLYCOL 3350 17 G/17G
1 PACKET MIXED WITH 8 OUNCES OF FLUID POWDER, FOR SOLUTION ORAL ONCE A DAY
Status: ACTIVE | COMMUNITY

## 2023-10-11 RX ORDER — METOCLOPRAMIDE 10 MG/1
1 TABLET BEFORE MEALS TABLET ORAL TWICE A DAY
Status: ACTIVE | COMMUNITY

## 2023-10-11 RX ORDER — CETIRIZINE HYDROCHLORIDE 10 MG/1
1 TABLET TABLET, FILM COATED ORAL ONCE A DAY
Status: ACTIVE | COMMUNITY

## 2023-10-11 RX ORDER — LOSARTAN POTASSIUM 25 MG/1
1 TABLET TABLET ORAL ONCE A DAY
Status: ACTIVE | COMMUNITY

## 2023-10-11 RX ORDER — LIDOCAINE 50 MG/G
1 APPLICATION AS NEEDED OINTMENT TOPICAL THREE TIMES A DAY
Status: ACTIVE | COMMUNITY

## 2023-10-11 RX ORDER — FUROSEMIDE 40 MG/1
1 TABLET TABLET ORAL ONCE A DAY
Status: ACTIVE | COMMUNITY

## 2023-10-11 RX ORDER — TRAZODONE HYDROCHLORIDE 50 MG/1
1 TABLET AT BEDTIME AS NEEDED TABLET ORAL ONCE A DAY
Status: ACTIVE | COMMUNITY

## 2023-10-11 RX ORDER — FLUOXETINE 20 MG/1
1 CAPSULE CAPSULE ORAL ONCE A DAY
Status: ACTIVE | COMMUNITY

## 2023-10-11 RX ORDER — INSULIN GLARGINE 100 [IU]/ML
INJECTION, SOLUTION SUBCUTANEOUS TAKE AS DIRECTED
Refills: 0 | Status: ACTIVE | COMMUNITY
Start: 2019-10-16

## 2023-10-11 RX ORDER — GABAPENTIN 300 MG/1
1 CAPSULE CAPSULE ORAL ONCE A DAY
Status: ACTIVE | COMMUNITY

## 2023-10-11 RX ORDER — ATORVASTATIN CALCIUM 80 MG/1
1 TABLET TABLET, FILM COATED ORAL ONCE A DAY
Status: ACTIVE | COMMUNITY

## 2023-10-11 RX ORDER — METOPROLOL TARTRATE 25 MG/1
1 TABLET WITH FOOD TABLET, FILM COATED ORAL TWICE A DAY
Status: ACTIVE | COMMUNITY

## 2023-10-11 RX ORDER — HUMAN INSULIN 100 [USP'U]/ML
INJECTION, SUSPENSION SUBCUTANEOUS TAKE AS DIRECTED
Refills: 0 | Status: ACTIVE | COMMUNITY
Start: 2019-10-16

## 2023-10-11 RX ORDER — OMEPRAZOLE 20 MG/1
1 TABLET 30 MINUTES BEFORE MORNING MEAL TABLET, DELAYED RELEASE ORAL ONCE A DAY
Qty: 30 | Refills: 1 | Status: ACTIVE | COMMUNITY
Start: 2022-09-12

## 2023-10-11 RX ORDER — ASPIRIN 81 MG/1
TABLET, DELAYED RELEASE ORAL TAKE AS DIRECTED
Refills: 0 | Status: ACTIVE | COMMUNITY
Start: 2019-10-16

## 2023-10-11 RX ORDER — TAMSULOSIN HYDROCHLORIDE 0.4 MG/1
1 CAPSULE CAPSULE ORAL ONCE A DAY
Status: ACTIVE | COMMUNITY

## 2023-10-11 RX ORDER — FAMOTIDINE 20 MG/1
TWICE A DAY TABLET ORAL TWICE A DAY
Refills: 3 | Status: ACTIVE | COMMUNITY
Start: 2019-10-16

## 2023-10-11 NOTE — HPI-TODAY'S VISIT:
Michael is a pleasant 72-year-old male who presents today for a follow up. Status post PCI x3 on 8/23/2022. History of cardiac stents taking Brilinta and ASA 81 mg daily.  Referred to colorectal surgery for evaluation of external hemorrhoids. Records from [facility] showed patient underwent a distal pancreatectomy and splenectomy for benign serous cystadenoma in the body tail of the pancreas.  Postop complicated by pseudocyst. Plan is to perform EGD/EUS once off Brilinta. Last visit noted lower abdominal pain still present though relieved with bowel movement.  Taking MiraLAX and Metamucil daily.  States when he has more bowel regularity abdominal pain is lessened.  1 episode of rectal bleeding 3 weeks prior to his previous office visit.  He stated he will be off the Brilinta in September.  Admitted to frequent passage of flatus.  Taking Bentyl 10 mg daily.  Recommend he continue current regimen in addition to low FODMAPs diet and high-fiber dietLabs dated 8/15/2023 showed a normal hemoglobin.  Normal platelets. Labs dated 5/4/23 showed a normal hemoglobin.  Normal LFTs.  Normal renal function FibroScan performed with Dr. Grace dated 3/28/2023 showed CAP score 291 and 8.0 kPa.  Concerning for fatty liver.  Recommended FibroScan 12 months from prior study.  Advised of lifestyle modifications.  Does not currently qualify for clinical trial therapy.  Normal LFTs. Underwent CT abdomen/pelvis with contrast dated 12/1/2022 showed gallstones.  Relatively diffuse spasm of the sigmoid colon suggestive of slight wall thickening.  Spasm may be incidental or due to mild inflammatory process such as colitis.  Urinary bladder appears full CT virtual colonography dated 12/19/2022 showed a 5 mm polyp in the sigmoid colon and transverse colon.  No additional polyp stricture or mass.  Colon is tortuous.  Recommend CT colonoscopy in 3 years.  Cholelithiasis. FibroScan dated 10/13/2022 showed S2 and F0 Barium swallow dated 11/1/2022 showed no evidence for mucosal abnormalities.  No evidence for strictures.  Small sliding-type hiatal hernia.  Mild gastroesophageal reflux EGD dated 9/20/2019 demonstrated a regular Z line.  A single sessile polyp was found and biopsied at the gastroesophageal junction.  A small hiatal hernia was present.  Inflammation was found in the gastric antrum.  The remaining portion of the exam was otherwise unremarkable. Recommended EUS for further evaluation of duodenal nodule measuring 7 mm in the second portion of the duodenum.  Duodenitis in the second portion of the duodenum.  Pathology demonstrated benign mucosa demonstrating reactive changes with prominent underlying adipose tissue no definitive dysplasia or malignancy.  Benign mucosa with areas demonstrating reactive changes but no evidence of celiac disease.  Chronic gastritis and reactive changes negative for H. pylori.  GE junction mucosa benign.  No intestinal metaplasia. Colonoscopy dated 9/20/2019 demonstrated one diminutive tubular adenoma removed from the mid sigmoid colon.  One diminutive tubular adenoma was removed from the mid transverse colon. One hyperplastic polyp removed from rectum. Nonbleeding internal hemorrhoids were present upon retroflexion and the remaining portion of the exam was otherwise unremarkable.  In light of patient's extremely difficult colonoscopy may consider virtual colonography when patient is due again for his surveillance modality. CT abdomen/pelvis with contrast dated 6/1/2022 showed diffuse hepatic steatosis.  Postsurgical changes of distal pancreatectomy.  Pancreatic head and uncinate process have normal morphology.  Simple subcentimeter right renal cysts which are not needed to follow-up  Patient states Bowel regularity has improved. Denies rectal bleeding other than when straining.  Takes miralax metamucil at night.   Lower abd cramping relieved with BM. Occcasional dysphagia to solids only though admits to eating fast at times Denies wt loss

## 2023-11-01 ENCOUNTER — TELEPHONE ENCOUNTER (OUTPATIENT)
Dept: URBAN - METROPOLITAN AREA CLINIC 63 | Facility: CLINIC | Age: 73
End: 2023-11-01

## 2023-11-13 NOTE — HPI-TODAY'S VISIT:
Michael is a pleasant 72-year-old male who presents today for follow up. Status post PCI x3 on 8/23/2022. History of cardiac stents recently placed taking Brilinta and ASA 81 mg daily.  Referred to colorectal surgery for evaluation of external hemorrhoids.  Last visit noted straining intermittently despite use of MiraLAX and Metamucil daily.  Occasional dyspepsia.  No abdominal pain.  Pain is relieved with bowel movement.  Taking omeprazole 20 mg daily and famotidine 20 mg daily but he is unclear on frequency.  Occasional rectal bleeding he attributes to hemorrhoids.  Given RectiCare cream.  Referred to Dr. Grace.  Repeat FibroScan due in October 2023.  Labs ordered last visit not completed. Plan is to perform  EUS once able to be cleared by cardiology.  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  Postop complicated by pseudocyst.  Underwent CT abdomen/pelvis with contrast dated 12/1/2022 showed gallstones.  Relatively diffuse spasm of the sigmoid colon suggestive of slight wall thickening.  Spasm may be incidental or due to mild inflammatory process such as colitis.  Urinary bladder appears full  CT virtual colonography dated 12/19/2022 showed a 5 mm polyp in the sigmoid colon and transverse colon.  No additional polyp stricture or mass.  Colon is tortuous.  Recommend CT colonoscopy in 3 years.  Cholelithiasis.  FibroScan dated 10/13/2022 showed S2 and F0  Barium swallow dated 11/1/2022 showed no evidence for mucosal abnormalities.  No evidence for strictures.  Small sliding-type hiatal hernia.  Mild gastroesophageal reflux  EGD dated 9/20/2019 demonstrated a regular Z line.  A single sessile polyp was found and biopsied at the gastroesophageal junction.  A small hiatal hernia was present.  Inflammation was found in the gastric antrum.  The remaining portion of the exam was otherwise unremarkable. Recommended EUS for further evaluation of duodenal nodule measuring 7 mm in the second portion of the duodenum.  Duodenitis in the second portion of the duodenum.  Pathology demonstrated benign mucosa demonstrating reactive changes with prominent underlying adipose tissue no definitive dysplasia or malignancy.  Benign mucosa with areas demonstrating reactive changes but no evidence of celiac disease.  Chronic gastritis and reactive changes negative for H. pylori.  GE junction mucosa benign.  No intestinal metaplasia.   Colonoscopy dated 9/20/2019 demonstrated one diminutive tubular adenoma removed from the mid sigmoid colon.  One diminutive tubular adenoma was removed from the mid transverse colon. One hyperplastic polyp removed from rectum. Nonbleeding internal hemorrhoids were present upon retroflexion and the remaining portion of the exam was otherwise unremarkable.  In light of patient's extremely difficult colonoscopy may consider virtual colonography when patient is due again for his surveillance modality.  Labs dated 8/26/2022 showed RBC 3.85, hemoglobin 12.8, hematocrit 38.  Platelets normal.  PT/INR normal.  Normal renal function.  Normal LFTs.  CT abdomen/pelvis with contrast dated 6/1/2022 showed diffuse hepatic steatosis.  Postsurgical changes of distal pancreatectomy.  Pancreatic head and uncinate process have normal morphology.  Simple subcentimeter right renal cysts which are not needed to follow-up  Patient states occasional straining with BM and rectal bleeding. Lower abd pain intermittently relieved with BM.   Takes metamucil prn.  More heartburn as of late.  Denies dysphagia, wt loss. Takes Gaviscon BID , and antacid which he doesnt have name.
24.9

## 2024-01-08 ENCOUNTER — TELEPHONE ENCOUNTER (OUTPATIENT)
Dept: URBAN - METROPOLITAN AREA CLINIC 63 | Facility: CLINIC | Age: 74
End: 2024-01-08

## 2024-01-08 ENCOUNTER — OFFICE VISIT (OUTPATIENT)
Dept: URBAN - METROPOLITAN AREA MEDICAL CENTER 3 | Facility: MEDICAL CENTER | Age: 74
End: 2024-01-08
Payer: OTHER GOVERNMENT

## 2024-01-08 DIAGNOSIS — R13.10 DYSPHAGIA: ICD-10-CM

## 2024-01-08 DIAGNOSIS — K22.2 SCHATZKI'S RING: ICD-10-CM

## 2024-01-08 DIAGNOSIS — K29.70 EROSIVE GASTRITIS: ICD-10-CM

## 2024-01-08 DIAGNOSIS — K31.7 POLYP OF DUODENUM: ICD-10-CM

## 2024-01-08 PROCEDURE — 43450 DILATE ESOPHAGUS 1/MULT PASS: CPT | Performed by: INTERNAL MEDICINE

## 2024-01-08 PROCEDURE — 43239 EGD BIOPSY SINGLE/MULTIPLE: CPT | Performed by: INTERNAL MEDICINE

## 2024-01-08 PROCEDURE — 43259 EGD US EXAM DUODENUM/JEJUNUM: CPT | Performed by: INTERNAL MEDICINE

## 2024-01-08 RX ORDER — TRAZODONE HYDROCHLORIDE 50 MG/1
1 TABLET AT BEDTIME AS NEEDED TABLET ORAL ONCE A DAY
COMMUNITY

## 2024-01-08 RX ORDER — ASPIRIN 81 MG/1
TABLET, DELAYED RELEASE ORAL TAKE AS DIRECTED
Refills: 0 | COMMUNITY
Start: 2019-10-16

## 2024-01-08 RX ORDER — OMEPRAZOLE 40 MG/1
1 CAPSULE 30 MINUTES BEFORE MORNING MEAL ONE CAPSULE 30MIN BEFORE DINNER CAPSULE, DELAYED RELEASE ORAL
Qty: 90 | Refills: 3 | OUTPATIENT
Start: 2024-01-08

## 2024-01-08 RX ORDER — METOPROLOL TARTRATE 25 MG/1
1 TABLET WITH FOOD TABLET, FILM COATED ORAL TWICE A DAY
COMMUNITY

## 2024-01-08 RX ORDER — METOCLOPRAMIDE 10 MG/1
1 TABLET BEFORE MEALS TABLET ORAL TWICE A DAY
COMMUNITY

## 2024-01-08 RX ORDER — OMEPRAZOLE 40 MG/1
1 CAPSULE 30 MINUTES BEFORE MORNING MEAL 1 CAPSULE 30MIN BEFORE DINNER CAPSULE, DELAYED RELEASE ORAL
Qty: 90 | Refills: 3 | OUTPATIENT
Start: 2024-01-08

## 2024-01-08 RX ORDER — GABAPENTIN 300 MG/1
1 CAPSULE CAPSULE ORAL ONCE A DAY
COMMUNITY

## 2024-01-08 RX ORDER — ATORVASTATIN CALCIUM 80 MG/1
1 TABLET TABLET, FILM COATED ORAL ONCE A DAY
COMMUNITY

## 2024-01-08 RX ORDER — LOSARTAN POTASSIUM 25 MG/1
1 TABLET TABLET ORAL ONCE A DAY
COMMUNITY

## 2024-01-08 RX ORDER — POLYETHYLENE GLYCOL 3350 17 G/17G
1 PACKET MIXED WITH 8 OUNCES OF FLUID POWDER, FOR SOLUTION ORAL ONCE A DAY
COMMUNITY

## 2024-01-08 RX ORDER — INSULIN GLARGINE 100 [IU]/ML
INJECTION, SOLUTION SUBCUTANEOUS TAKE AS DIRECTED
Refills: 0 | COMMUNITY
Start: 2019-10-16

## 2024-01-08 RX ORDER — FUROSEMIDE 40 MG/1
1 TABLET TABLET ORAL ONCE A DAY
COMMUNITY

## 2024-01-08 RX ORDER — OMEPRAZOLE 20 MG/1
1 TABLET 30 MINUTES BEFORE MORNING MEAL TABLET, DELAYED RELEASE ORAL ONCE A DAY
Qty: 30 | Refills: 1 | COMMUNITY
Start: 2022-09-12

## 2024-01-08 RX ORDER — FAMOTIDINE 20 MG/1
TWICE A DAY TABLET ORAL TWICE A DAY
Refills: 3 | COMMUNITY
Start: 2019-10-16

## 2024-01-08 RX ORDER — LIDOCAINE 50 MG/G
1 APPLICATION AS NEEDED OINTMENT TOPICAL THREE TIMES A DAY
COMMUNITY

## 2024-01-08 RX ORDER — HUMAN INSULIN 100 [USP'U]/ML
INJECTION, SUSPENSION SUBCUTANEOUS TAKE AS DIRECTED
Refills: 0 | COMMUNITY
Start: 2019-10-16

## 2024-01-08 RX ORDER — FLUOXETINE 20 MG/1
1 CAPSULE CAPSULE ORAL ONCE A DAY
COMMUNITY

## 2024-01-08 RX ORDER — CETIRIZINE HYDROCHLORIDE 10 MG/1
1 TABLET TABLET, FILM COATED ORAL ONCE A DAY
COMMUNITY

## 2024-01-08 RX ORDER — TAMSULOSIN HYDROCHLORIDE 0.4 MG/1
1 CAPSULE CAPSULE ORAL ONCE A DAY
COMMUNITY

## 2024-01-24 ENCOUNTER — DASHBOARD ENCOUNTERS (OUTPATIENT)
Age: 74
End: 2024-01-24

## 2024-01-24 ENCOUNTER — LAB OUTSIDE AN ENCOUNTER (OUTPATIENT)
Dept: URBAN - METROPOLITAN AREA CLINIC 63 | Facility: CLINIC | Age: 74
End: 2024-01-24

## 2024-01-24 ENCOUNTER — OFFICE VISIT (OUTPATIENT)
Dept: URBAN - METROPOLITAN AREA CLINIC 63 | Facility: CLINIC | Age: 74
End: 2024-01-24
Payer: OTHER GOVERNMENT

## 2024-01-24 VITALS
TEMPERATURE: 97 F | HEIGHT: 69 IN | RESPIRATION RATE: 20 BRPM | SYSTOLIC BLOOD PRESSURE: 117 MMHG | HEART RATE: 82 BPM | DIASTOLIC BLOOD PRESSURE: 60 MMHG | WEIGHT: 230 LBS | OXYGEN SATURATION: 91 % | BODY MASS INDEX: 34.07 KG/M2

## 2024-01-24 DIAGNOSIS — R10.30 LOWER ABDOMINAL PAIN: ICD-10-CM

## 2024-01-24 DIAGNOSIS — Z86.010 PERSONAL HISTORY OF COLONIC POLYPS: ICD-10-CM

## 2024-01-24 DIAGNOSIS — R19.4 CHANGE IN BOWEL HABIT: ICD-10-CM

## 2024-01-24 DIAGNOSIS — K76.0 FATTY LIVER: ICD-10-CM

## 2024-01-24 PROBLEM — 71457002: Status: ACTIVE | Noted: 2024-01-24

## 2024-01-24 PROCEDURE — 99214 OFFICE O/P EST MOD 30 MIN: CPT | Performed by: INTERNAL MEDICINE

## 2024-01-24 RX ORDER — FLUOXETINE 20 MG/1
1 CAPSULE CAPSULE ORAL ONCE A DAY
Status: ACTIVE | COMMUNITY

## 2024-01-24 RX ORDER — ATORVASTATIN CALCIUM 80 MG/1
1 TABLET TABLET, FILM COATED ORAL ONCE A DAY
Status: ACTIVE | COMMUNITY

## 2024-01-24 RX ORDER — TAMSULOSIN HYDROCHLORIDE 0.4 MG/1
1 CAPSULE CAPSULE ORAL ONCE A DAY
Status: ACTIVE | COMMUNITY

## 2024-01-24 RX ORDER — LIDOCAINE 50 MG/G
1 APPLICATION AS NEEDED OINTMENT TOPICAL THREE TIMES A DAY
Status: ACTIVE | COMMUNITY

## 2024-01-24 RX ORDER — FUROSEMIDE 40 MG/1
1 TABLET TABLET ORAL ONCE A DAY
Status: ACTIVE | COMMUNITY

## 2024-01-24 RX ORDER — OMEPRAZOLE 40 MG/1
1 CAPSULE 30 MINUTES BEFORE MORNING MEAL 1 CAPSULE 30MIN BEFORE DINNER CAPSULE, DELAYED RELEASE ORAL
Qty: 90 | Refills: 3 | Status: ACTIVE | COMMUNITY
Start: 2024-01-08

## 2024-01-24 RX ORDER — GABAPENTIN 300 MG/1
1 CAPSULE CAPSULE ORAL ONCE A DAY
Status: ACTIVE | COMMUNITY

## 2024-01-24 RX ORDER — ASPIRIN 81 MG/1
TABLET, DELAYED RELEASE ORAL TAKE AS DIRECTED
Refills: 0 | Status: ACTIVE | COMMUNITY
Start: 2019-10-16

## 2024-01-24 RX ORDER — TRAZODONE HYDROCHLORIDE 50 MG/1
1 TABLET AT BEDTIME AS NEEDED TABLET ORAL ONCE A DAY
Status: ACTIVE | COMMUNITY

## 2024-01-24 RX ORDER — INSULIN GLARGINE 100 [IU]/ML
INJECTION, SOLUTION SUBCUTANEOUS TAKE AS DIRECTED
Refills: 0 | Status: ACTIVE | COMMUNITY
Start: 2019-10-16

## 2024-01-24 RX ORDER — HUMAN INSULIN 100 [USP'U]/ML
INJECTION, SUSPENSION SUBCUTANEOUS TAKE AS DIRECTED
Refills: 0 | Status: ACTIVE | COMMUNITY
Start: 2019-10-16

## 2024-01-24 RX ORDER — FAMOTIDINE 20 MG/1
TWICE A DAY TABLET ORAL TWICE A DAY
Refills: 3 | Status: ACTIVE | COMMUNITY
Start: 2019-10-16

## 2024-01-24 RX ORDER — METOPROLOL TARTRATE 25 MG/1
1 TABLET WITH FOOD TABLET, FILM COATED ORAL TWICE A DAY
Status: ACTIVE | COMMUNITY

## 2024-01-24 RX ORDER — METOCLOPRAMIDE 10 MG/1
1 TABLET BEFORE MEALS TABLET ORAL TWICE A DAY
Status: ACTIVE | COMMUNITY

## 2024-01-24 RX ORDER — LOSARTAN POTASSIUM 25 MG/1
1 TABLET TABLET ORAL ONCE A DAY
Status: ACTIVE | COMMUNITY

## 2024-01-24 RX ORDER — POLYETHYLENE GLYCOL 3350 17 G/17G
1 PACKET MIXED WITH 8 OUNCES OF FLUID POWDER, FOR SOLUTION ORAL ONCE A DAY
Status: ACTIVE | COMMUNITY

## 2024-01-24 RX ORDER — CETIRIZINE HYDROCHLORIDE 10 MG/1
1 TABLET TABLET, FILM COATED ORAL ONCE A DAY
Status: ACTIVE | COMMUNITY

## 2024-01-24 RX ORDER — OMEPRAZOLE 20 MG/1
1 TABLET 30 MINUTES BEFORE MORNING MEAL TABLET, DELAYED RELEASE ORAL ONCE A DAY
Qty: 30 | Refills: 1 | Status: ACTIVE | COMMUNITY
Start: 2022-09-12

## 2024-01-24 NOTE — HPI-TODAY'S VISIT:
Michael is a pleasant 73-year-old male who presents today for a procedure follow up. Referred to colorectal surgery for evaluation of external hemorrhoids. Records from [facility] showed patient underwent a distal pancreatectomy and splenectomy for benign serous cystadenoma in the body tail of the pancreas.  Postop complicated by pseudocyst. Last visit noted bowel regularity had improved.  Noted occasional rectal bleeding when straining.  Taking MiraLAX and Metamucil at night.  Lower abdominal cramping relieved with bowel movement.  Occasional dysphagia to solids only though admits to eating fast that time.  Recommended prunes daily.  Bentyl was increased to 10 mg twice daily  EGD/EUS dated 1/8/2024.  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  Erosive gastritis with a clean-based antral ulcer measuring 6 mm in size.  Abnormal appearing mucosa in the cardia.  Small to moderate size hiatal hernia.  Widely patent Schatzki's ring.  Distal esophageal bulge of doubtful concern.  Dilation was performed with a 50 Uzbek Rodriguez dilator.  Patient was placed on omeprazole 40 mg twice daily with intentions of ordering a CT scan of the abdomen and chest to evaluate the duodenal nodule and for any variceal disease.  Antral mucosa with reactive changes no evidence of H. pylori.  Body and antral type gastric mucosa without significant histopathologic changes.  Cardia biopsy showed body type gastric mucosa.  Distal esophageal biopsy showed squamous esophageal mucosa with mild spongiosis, reactive changes and reactive lymphocytosis  Barium swallow dated 11/16/2023 showed mild esophageal dysmotility  Labs dated 8/15/2023 showed a normal hemoglobin.  Normal platelets.  Labs dated 5/4/23 showed a normal hemoglobin.  Normal LFTs.  Normal renal function FibroScan performed with Dr. Grace dated 3/28/2023 showed CAP score 291 and 8.0 kPa.  Concerning for fatty liver.  Recommended FibroScan 12 months from prior study.  Advised of lifestyle modifications.  Does not currently qualify for clinical trial therapy.  Normal LFTs. Underwent CT abdomen/pelvis with contrast dated 12/1/2022 showed gallstones.  Relatively diffuse spasm of the sigmoid colon suggestive of slight wall thickening.  Spasm may be incidental or due to mild inflammatory process such as colitis.  Urinary bladder appears full  CT virtual colonography dated 12/19/2022 showed a 5 mm polyp in the sigmoid colon and transverse colon.  No additional polyp stricture or mass.  Colon is tortuous.  Recommend CT colonoscopy in 3 years.  Cholelithiasis.  FibroScan dated 10/13/2022 showed S2 and F0  EGD dated 9/20/2019 demonstrated a regular Z line.  A single sessile polyp was found and biopsied at the gastroesophageal junction.  A small hiatal hernia was present.  Inflammation was found in the gastric antrum.  The remaining portion of the exam was otherwise unremarkable. Recommended EUS for further evaluation of duodenal nodule measuring 7 mm in the second portion of the duodenum.  Duodenitis in the second portion of the duodenum.  Pathology demonstrated benign mucosa demonstrating reactive changes with prominent underlying adipose tissue no definitive dysplasia or malignancy.  Benign mucosa with areas demonstrating reactive changes but no evidence of celiac disease.  Chronic gastritis and reactive changes negative for H. pylori.  GE junction mucosa benign.  No intestinal metaplasia.   Colonoscopy dated 9/20/2019 demonstrated one diminutive tubular adenoma removed from the mid sigmoid colon.  One diminutive tubular adenoma was removed from the mid transverse colon. One hyperplastic polyp removed from rectum. Nonbleeding internal hemorrhoids were present upon retroflexion and the remaining portion of the exam was otherwise unremarkable.  In light of patient's extremely difficult colonoscopy may consider virtual colonography when patient is due again for his surveillance modality.  CT abdomen/pelvis with contrast dated 6/1/2022 showed diffuse hepatic steatosis.  Postsurgical changes of distal pancreatectomy.  Pancreatic head and uncinate process have normal morphology.  Simple subcentimeter right renal cysts which are not needed to follow-up  Patient states still with intermittent lower cramping , that is relieved with BM. Takes mirlalax daily , MG citrate capsules and metamucil daily gummies, though still with straining. Occcasional rectal bleeding in water after straining.    Patient with intermitttent coughing choking with solids.  Denies heartburn.  Minimal wt loss. Still takes bentyl

## 2024-05-06 ENCOUNTER — OFFICE VISIT (OUTPATIENT)
Dept: URBAN - METROPOLITAN AREA CLINIC 60 | Facility: CLINIC | Age: 74
End: 2024-05-06

## 2024-09-03 NOTE — PHYSICAL EXAM GASTROINTESTINAL
Abdomen , soft, nontender, nondistended , no guarding or rigidity , no masses palpable , normal bowel sounds , Liver and Spleen,  no hepatosplenomegaly , liver nontender September 3, 2024     Dakotah Villalba MD  [address] [id number]    Patient: Tobias Valverde  YOB: 1946  Date of Visit: 9/3/2024      Dear Dr. Villalba:    Thank you for referring Tobias Vavlerde to me for evaluation. Below are my notes for this consultation.    If you have questions, please do not hesitate to call me. I look forward to following your patient along with you.         Sincerely,        Manuel Nichole MD        CC: MD Dayanara Somers Thomas P, MD  9/3/2024  1:32 PM  Sign when Signing Visit  9/3/2024      Dakotah Villalba MD  [address]     Manuel Nichole MD Esberg, Douglas B, MD     Re:  TOBIAS VALVERDE  :  1946      Dear Doctors:    It was my pleasure to see Tobias Valverde in the cardiovascular office today.  We will follow Tobias for  subclinical coronary artery disease with an Agatston score of 648 with two vessel involvement, hypertension and polygenic hypercholesterolemia.    He  underwent right total knee replacement  at the [facility].  This was complicated by postoperative paroxysmal SVT.  He was discharged on flecainide 100 mg twice daily and Toprol- mg daily.  Ultimately he underwent a [employer]tr[employer] loop recorder and a subsequent ablation for atrial tachyarrhythmias. 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  He is now in persistent rate controlled atrial flutter and on a combination of Toprol-XL and Eliquis.     Tobias reported to this office after visiting his ophthalmologist.  Apparently he has had bleeding involving his right and left eyes. Workup was negative. This led to a rediscussion of his atrial flutter and anticoagulation.  He remains on Eliquis and Toprol-XL.    We reviewed his prevention program in detail.  His last lipid panel is noted below and is excellent.  Presently, he is on Crestor 10 mg  daily.     There is evidence of insulin resistance.  We recommended a weight reduction diet.  His goal weight is 220 pounds.  His present weight is 231 pounds.  We discussed a low-carbohydrate, low-saturated fat Mediterranean Diet.    He was noticing some dyspnea on exertion in late 2023.  He does carry history of phrenic nerve paralysis.  He is in chronic atrial flutter with a baseline heart rate of 80.  We  scheduled him for stress echo was negative for ischemia. We had him undergo a PET scan to rule out ischemia January 2024.  His scan showed a moderate size reversible defect in the mid to apical anteroseptal walls consistent with ischemia.  Due to this result he was scheduled for cardiac catheterization which she had performed on 1/31/2024.  Cardiac catheterization demonstrated patent coronary arteries except for the distal LAD had a 40% lesion.  Medical management is indicated.    Presently Tobias is denying any chest pain or shortness of breath.  He is demonstrating cardiovascular stability.        PAST MEDICAL HISTORY:      Past Medical History:   Diagnosis Date   • Arthritis    • Atrial fibrillation (CMS/HCC)    • Atrial tachycardia (CMS/HCC) 02/23/2019   • Atypical atrial flutter (CMS/HCC) 12/25/2023   • CAD (coronary artery disease) 05/14/2018   • Coronary artery disease    • Dilated aortic root (CMS/HCC) 09/03/2024    Dilated aortic root at 4.4 cm.   • Essential hypertension 05/14/2018   • History of traumatic subdural hematoma 08/20/2018 2008. Had successful evacuation   • Homozygous for [id number] polymorphism of MTHFR (CMS/HCC)    • Hypercholesterolemia 05/14/2018   • Hyperlipidemia    • Hypertension    • Infection associated with internal knee prosthesis  (CMS/HCC)    • Obesity    • Paroxysmal atrial fibrillation (CMS/HCC) 02/23/2019    Ablation 2019 with subsequent phrenic nerve paralysis.   • Partial small bowel obstruction (CMS/HCC)    • Phrenic nerve paralysis     resolved   • Renal cyst    • Retinal  hemorrhage    • SVT (supraventricular tachycardia) (CMS/HCC)    • Traumatic subdural hematoma (CMS/HCC)      Past Surgical History:   Procedure Laterality Date   • APPENDECTOMY     • CARDIAC ELECTROPHYSIOLOGY STUDY AND ABLATION  2019    Atrial fibrillation: PVI, roof, lateral mitral isthmus, CFAE and CTI.  Subsequent phrenic nerve injury   • CARDIAC ELECTROPHYSIOLOGY STUDY AND ABLATION  2019    [place] [facility]   • HERNIA REPAIR  2021    inguinal hernia's   (X2)   • KNEE SURGERY Right 2019    infection   • REPLACEMENT TOTAL KNEE Right 2019   • SUBDURAL HEMATOMA EVACUATION VIA CRANIOTOMY      Neurosurgery for subdural hematoma      CURRENT MEDICATIONS:   Current Outpatient Medications   Medication Instructions   • amLODIPine (NORVASC) 5 mg, oral, Daily   • amoxicillin (AMOXIL) 500 mg capsule TAKE 4 CAPSULES BY MOUTH 1 HOUR PRIOR TO APPOINTMENT   • apixaban (ELIQUIS) 5 mg, oral, 2 times daily   • cholecalciferol, vitD3,/vit K2 (VITAMIN D3-VITAMIN K2 ORAL) oral, Daily   • coenzyme Q10 (COQ10) 200 mg, oral, Daily   • flaxseed oiL oil Other, Daily   • latanoprost (XALATAN) 0.005 % ophthalmic solution 1 drop, Both Eyes, Nightly   • lisinopriL (PRINIVIL) 40 mg, oral, Daily   • metoprolol succinate XL (TOPROL-XL) 100 mg, oral, Daily   • rosuvastatin (CRESTOR) 10 mg, oral, Daily   • vitamin B complex (B COMPLEX ORAL) oral, Daily   • white petrolatum-mineral oiL (LUBRIFRESH PM) 83-15 % ointment ophthalmic, Nightly   • ZINC ORAL oral, Daily        SUPPLEMENTS:  Coenzyme-Q10 100 mg daily, vitamin-D 2865-6821 international units daily, omega-3 fish oil 1000 mg daily, multiple vitamin.    ALLERGIES:  No known drug allergies.    FAMILY HISTORY:  Father  young at age 55 of lung cancer.  Mother has a pacemaker.    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  His wife's name is Jaye.  They have three children.  He has seven grandchildren.  He  smoked 38 years ago for five years.  He rarely drinks alcohol.  He has an active lifestyle.  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    REVIEW OF SYSTEMS: Tobias is now in persistent atrial flutter with rate control.    PHYSICAL EXAMINATION:  Vitals:    09/03/24 1103   BP: (!) 134/94   Pulse: 80   SpO2: 99%       Wt Readings from Last 3 Encounters:   09/03/24 105 kg (231 lb)   08/19/24 102 kg (225 lb)   06/03/24 102 kg (225 lb)     BP Readings from Last 3 Encounters:   09/03/24 (!) 134/94   08/19/24 (!) 164/99   06/03/24 (!) 147/92     Cardiovascular ROS: no chest pain or dyspnea on exertion     LABORATORY DATA:      EKG:  Flutter rate 80    Coronary calcium score 2016:  648 with two vessel involvement.      Stress echo was negative for myocardial ischemia or scar, normal left ventricular size, preserved systolic function.    Myocardial perfusion scan February 2019: Normal    Echocardiogram February 2019: Normal left ventricular size preserved function.  Mild mitral and tricuspid regurgitation.  Ascending aorta measured 4.2 cm.    Echocardiogram September 2021:  • The left ventricle is normal in size. Normal left ventricular wall thickness. There are no segmental wall motion abnormalities. Normal left ventricular ejection fraction. The left ventricular ejection fraction by visual estimate is 65% to 70%.  • Unable to assess LV diastolic function due to arrhythmia.  • The right ventricle is normal in size. There is normal function of the right ventricle.  • There is no mitral regurgitation. There is no mitral stenosis.  • There is no aortic stenosis. There is mild aortic regurgitation.  • There is trace tricuspid regurgitation.  • The aortic root is mildly enlarged. The aorta measures 4.2 cm at the sinus of Valsalva. The proximal segment of the ascending aorta is mildly enlarged. The proximal segment of the ascending aorta measures 4.0 cm.    Lipid panel:   Component      Latest Ref Rng  12/29/2021 7/21/2022 9/18/2023 8/29/2024   Triglycerides      <150 mg/dL 80  45  78  48    Cholesterol      <=200 mg/dL 149  152  137  134    HDL      >=40 mg/dL 62  62  60  66    LDL Calculated      <=100 mg/dL 71  81  61  58    Non-HDL, Calculated      mg/dL 87  90  77  68    RISK      <=5.0  2.4  2.5  2.3  2.0          The 10-year ASCVD risk score (Emilia SESAY, et al., 2019) is: 26.8%    Values used to calculate the score:      Age: 77 years      Sex: Male      Is Non- : No      Diabetic: No      Tobacco smoker: No      Systolic Blood Pressure: 134 mmHg      Is BP treated: Yes      HDL Cholesterol: 66 mg/dL      Total Cholesterol: 134 mg/dL     IMPRESSIONS/RECOMMENDATIONS:    1. Coronary artery disease.  The patient's stress test was negative for ischemia.  He is not on aspirin as he is on Eliquis.  PET scan showed a reversible defect.  Cardiac catheterization showed 40% distal LAD lesion otherwise patent coronary arteries.  We will continue optimal medical management.    2. Hypertension.  Continue Toprol-XL and lisinopril and amlodipine.    3. Polygenic hypercholesterolemia.  The patient will continue his Crestor  10 mg daily.  Our goal is an LDL below 70.  Needs repeat lipid panel.    4. MTHFR polymorphism.  Continue B-complex with L5 methyl folate.    5. Insulin resistance.  Continue low carbohydrate low saturated fat Mediterranean diet and exercise program.    6. Obesity.   We discussed a low-carbohydrate, low-saturated fat Mediterranean Diet and an exercise walking program.  His goal weight is under 220 pounds.    7.         Persistent atrial flutter.  Status post A. fib ablation complicated by phrenic nerve paralysis.  Patient is now resigned to rate control and anticoagulation for his atrial flutter.  He is followed by Dr. Su who does not feel he will benefit from ablation of his atrial flutter.  He should remain on Eliquis.    8.         History of subdural hematoma.  This was  secondary to head trauma and was not spontaneous.  He is on Eliquis now for his atrial flutter.    9.         History of phrenic nerve injury with diaphragmatic paralysis.  Resolved.    10.       History of total knee replacement.  Followed [facility].  He is cleared for a left total knee replacement.    11.       History of cold agglutinins.  Followed [facility].    12.       Chronic kidney disease stage IIIa.  Patient's recent creatinine is 1.1 with a GFR of 60      13.       Dilated aortic root.  The patient's recent echocardiogram demonstrated mild dilatation of the sinus of Valsalva at 4.2 cm and ascending thoracic aorta at 4.0 cm.  A CT angiogram of the chest 2019 demonstrated normal aortic root at 3.9 cm.    14.       Dyspnea on exertion.  The patient does have a history of phrenic nerve paralysis as well as chronic atrial flutter with controlled ventricular response. .    Summary:.      Tobias is demonstrating cardiovascular stability.      We reviewed his cardiac history.  He now has chronic atrial flutter rate controlled with Toprol-XL and anticoagulated with Eliquis.      His blood pressures been reasonably well controlled with the use of amlodipine and lisinopril.     He was counseled on low carbohydrate low saturated fat Mediterranean diet.    He is cleared for left knee surgery.    This was a 30 minute patient encounter with greater than 50% time spent in care coordination and counseling.       Sincerely,    Manuel Nichole MD    09/03/24

## 2024-12-10 ENCOUNTER — LAB OUTSIDE AN ENCOUNTER (OUTPATIENT)
Dept: URBAN - METROPOLITAN AREA CLINIC 60 | Facility: CLINIC | Age: 74
End: 2024-12-10

## 2024-12-10 ENCOUNTER — OFFICE VISIT (OUTPATIENT)
Dept: URBAN - METROPOLITAN AREA CLINIC 60 | Facility: CLINIC | Age: 74
End: 2024-12-10
Payer: OTHER GOVERNMENT

## 2024-12-10 VITALS
HEIGHT: 69 IN | BODY MASS INDEX: 33.98 KG/M2 | OXYGEN SATURATION: 98 % | RESPIRATION RATE: 12 BRPM | DIASTOLIC BLOOD PRESSURE: 68 MMHG | TEMPERATURE: 98.7 F | WEIGHT: 229.4 LBS | SYSTOLIC BLOOD PRESSURE: 118 MMHG | HEART RATE: 84 BPM

## 2024-12-10 DIAGNOSIS — Z87.11 HISTORY OF GASTRIC ULCER: ICD-10-CM

## 2024-12-10 DIAGNOSIS — K31.89 DUODENAL NODULE: ICD-10-CM

## 2024-12-10 DIAGNOSIS — K59.09 OTHER CONSTIPATION: ICD-10-CM

## 2024-12-10 DIAGNOSIS — K76.0 FATTY LIVER: ICD-10-CM

## 2024-12-10 DIAGNOSIS — R10.13 EPIGASTRIC PAIN: ICD-10-CM

## 2024-12-10 PROCEDURE — 99214 OFFICE O/P EST MOD 30 MIN: CPT

## 2024-12-10 RX ORDER — METOCLOPRAMIDE 10 MG/1
1 TABLET BEFORE MEALS TABLET ORAL TWICE A DAY
Status: ACTIVE | COMMUNITY

## 2024-12-10 RX ORDER — GABAPENTIN 300 MG/1
1 CAPSULE CAPSULE ORAL ONCE A DAY
Status: ACTIVE | COMMUNITY

## 2024-12-10 RX ORDER — AMLODIPINE BESYLATE 5 MG/1
1 TABLET TABLET ORAL ONCE A DAY
Status: ACTIVE | COMMUNITY

## 2024-12-10 RX ORDER — LIDOCAINE 50 MG/G
1 APPLICATION AS NEEDED OINTMENT TOPICAL THREE TIMES A DAY
Status: ACTIVE | COMMUNITY

## 2024-12-10 RX ORDER — TAMSULOSIN HYDROCHLORIDE 0.4 MG/1
1 CAPSULE CAPSULE ORAL ONCE A DAY
Status: ACTIVE | COMMUNITY

## 2024-12-10 RX ORDER — OMEPRAZOLE 40 MG/1
1 CAPSULE 30 MINUTES BEFORE MORNING MEAL 1 CAPSULE 30MIN BEFORE DINNER CAPSULE, DELAYED RELEASE ORAL
Qty: 90 | Refills: 3 | Status: ACTIVE | COMMUNITY
Start: 2024-01-08

## 2024-12-10 RX ORDER — HUMAN INSULIN 100 [USP'U]/ML
INJECTION, SUSPENSION SUBCUTANEOUS TAKE AS DIRECTED
Refills: 0 | Status: ACTIVE | COMMUNITY
Start: 2019-10-16

## 2024-12-10 RX ORDER — INSULIN GLARGINE 100 [IU]/ML
INJECTION, SOLUTION SUBCUTANEOUS TAKE AS DIRECTED
Refills: 0 | Status: ACTIVE | COMMUNITY
Start: 2019-10-16

## 2024-12-10 RX ORDER — ATORVASTATIN CALCIUM 80 MG/1
1 TABLET TABLET, FILM COATED ORAL ONCE A DAY
Status: ACTIVE | COMMUNITY

## 2024-12-10 RX ORDER — TRAZODONE HYDROCHLORIDE 50 MG/1
1 TABLET AT BEDTIME AS NEEDED TABLET ORAL ONCE A DAY
Status: ACTIVE | COMMUNITY

## 2024-12-10 RX ORDER — ASPIRIN 81 MG/1
TABLET, DELAYED RELEASE ORAL TAKE AS DIRECTED
Refills: 0 | Status: ACTIVE | COMMUNITY
Start: 2019-10-16

## 2024-12-10 RX ORDER — POLYETHYLENE GLYCOL 3350 17 G/17G
1 PACKET MIXED WITH 8 OUNCES OF FLUID POWDER, FOR SOLUTION ORAL ONCE A DAY
Status: ACTIVE | COMMUNITY

## 2024-12-10 RX ORDER — DICYCLOMINE HYDROCHLORIDE 10 MG/1
CAPSULE ORAL
Qty: 60 CAPSULE | Status: ACTIVE | COMMUNITY

## 2024-12-10 RX ORDER — FLUOXETINE 20 MG/1
1 CAPSULE CAPSULE ORAL ONCE A DAY
Status: ACTIVE | COMMUNITY

## 2024-12-10 RX ORDER — LOSARTAN POTASSIUM 25 MG/1
1 TABLET TABLET ORAL ONCE A DAY
Status: ACTIVE | COMMUNITY

## 2024-12-10 RX ORDER — METOPROLOL TARTRATE 25 MG/1
1 TABLET WITH FOOD TABLET, FILM COATED ORAL TWICE A DAY
Status: ACTIVE | COMMUNITY

## 2024-12-10 RX ORDER — CETIRIZINE HYDROCHLORIDE 10 MG/1
1 TABLET TABLET, FILM COATED ORAL ONCE A DAY
Status: ACTIVE | COMMUNITY

## 2024-12-10 NOTE — HPI-PREVIOUS IMAGING
CT virtual colonography dated 12/19/2022 showed a 5 mm polyp in the sigmoid colon and transverse colon. No additional polyp stricture or mass. Colon is tortuous. Recommend CT colonoscopy in 3 years. Cholelithiasis.  Barium swallow dated 11/16/2023 showed mild esophageal dysmotility  FibroScan dated 10/13/2022 showed S2 and F0  FibroScan performed with Dr. Grace dated 3/28/2023 showed CAP score 291 and 8.0 kPa. Concerning for fatty liver. Recommended FibroScan 12 months from prior study. Advised of lifestyle modifications. Does not currently qualify for clinical trial therapy. Normal LFTs.  CT abdomen/pelvis with contrast dated 6/1/2022 showed diffuse hepatic steatosis. Postsurgical changes of distal pancreatectomy. Pancreatic head and uncinate process have normal morphology. Simple subcentimeter right renal cysts which are not needed to follow-up

## 2024-12-10 NOTE — HPI-PREVIOUS LABS
Labs dated 8/15/2023 showed a normal hemoglobin. Normal platelets.  Labs dated 5/4/23 showed a normal hemoglobin. Normal LFTs. Normal renal function

## 2024-12-10 NOTE — HPI-TODAY'S VISIT:
Michael is a pleasant 73-year-old male who presents today for a complaint of abdominal pain. Previous Dr. Diop patient. 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  Postop complicated by pseudocyst. Last visit noted bowel regularity had improved.  Noted occasional rectal bleeding when straining.   Patient coming in accompanied by his wife complaining of postprandial epigastric/umbilical pain that has been ongoing for the past 2 years or so.  Denies nausea, vomiting, fever, chills.  Small amount of reflux that he feels splashing on his esophagus.  Occasional issue with food feeling like it is "slowly going down".  Bowel movements tend to be on the more constipated side.  Patient usually having a bowel movement every other day but feels its incomplete at times with pushing and straining.  Currently taking Metamucil once a day and MiraLAX as needed.  Previous EGD dictated below did show antral ulcer 6 mm in size.  Denies any signs of GI bleeding, unintentional weight loss or changes in stool.

## 2024-12-10 NOTE — HPI-PREVIOUS PROCEDURES
EGD/EUS dated 1/8/2024. 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 Erosive gastritis with a clean-based antral ulcer measuring 6 mm in size. Abnormal appearing mucosa in the cardia. Small to moderate size hiatal hernia. Widely patent Schatzki's ring. Distal esophageal bulge of doubtful concern. Dilation was performed with a 50 Fijian Rodriguez dilator. Patient was placed on omeprazole 40 mg twice daily with intentions of ordering a CT scan of the abdomen and chest to evaluate the duodenal nodule and for any variceal disease. Antral mucosa with reactive changes no evidence of H. pylori. Body and antral type gastric mucosa without significant histopathologic changes. Cardia biopsy showed body type gastric mucosa. Distal esophageal biopsy showed squamous esophageal mucosa with mild spongiosis, reactive changes and reactive lymphocytosis  EGD dated 9/20/2019 demonstrated a regular Z line. A single sessile polyp was found and biopsied at the gastroesophageal junction. A small hiatal hernia was present. Inflammation was found in the gastric antrum. The remaining portion of the exam was otherwise unremarkable. Recommended EUS for further evaluation of duodenal nodule measuring 7 mm in the second portion of the duodenum. Duodenitis in the second portion of the duodenum. Pathology demonstrated benign mucosa demonstrating reactive changes with prominent underlying adipose tissue no definitive dysplasia or malignancy. Benign mucosa with areas demonstrating reactive changes but no evidence of celiac disease. Chronic gastritis and reactive changes negative for H. pylori. GE junction mucosa benign. No intestinal metaplasia.   Colonoscopy dated 9/20/2019 demonstrated one diminutive tubular adenoma removed from the mid sigmoid colon. One diminutive tubular adenoma was removed from the mid transverse colon. One hyperplastic polyp removed from rectum. Nonbleeding internal hemorrhoids were present upon retroflexion and the remaining portion of the exam was otherwise unremarkable. In light of patient's extremely difficult colonoscopy may consider virtual colonography when patient is due again for his surveillance modality.

## 2024-12-13 ENCOUNTER — TELEPHONE ENCOUNTER (OUTPATIENT)
Dept: URBAN - METROPOLITAN AREA CLINIC 63 | Facility: CLINIC | Age: 74
End: 2024-12-13

## 2024-12-13 RX ORDER — OMEPRAZOLE 40 MG/1
1 CAPSULE 30 MINUTES BEFORE MORNING MEAL 1 CAPSULE 30MIN BEFORE DINNER CAPSULE, DELAYED RELEASE ORAL
Qty: 90 | Refills: 3
Start: 2024-01-08

## 2024-12-30 ENCOUNTER — TELEPHONE ENCOUNTER (OUTPATIENT)
Dept: URBAN - METROPOLITAN AREA CLINIC 63 | Facility: CLINIC | Age: 74
End: 2024-12-30

## 2025-01-08 ENCOUNTER — TELEPHONE ENCOUNTER (OUTPATIENT)
Dept: URBAN - METROPOLITAN AREA CLINIC 63 | Facility: CLINIC | Age: 75
End: 2025-01-08

## 2025-01-09 ENCOUNTER — TELEPHONE ENCOUNTER (OUTPATIENT)
Dept: URBAN - METROPOLITAN AREA CLINIC 63 | Facility: CLINIC | Age: 75
End: 2025-01-09

## 2025-01-24 ENCOUNTER — LAB OUTSIDE AN ENCOUNTER (OUTPATIENT)
Dept: URBAN - METROPOLITAN AREA CLINIC 60 | Facility: CLINIC | Age: 75
End: 2025-01-24

## 2025-01-24 ENCOUNTER — OFFICE VISIT (OUTPATIENT)
Dept: URBAN - METROPOLITAN AREA CLINIC 60 | Facility: CLINIC | Age: 75
End: 2025-01-24
Payer: OTHER GOVERNMENT

## 2025-01-24 VITALS
TEMPERATURE: 98.5 F | DIASTOLIC BLOOD PRESSURE: 76 MMHG | HEIGHT: 69 IN | OXYGEN SATURATION: 93 % | BODY MASS INDEX: 34.42 KG/M2 | SYSTOLIC BLOOD PRESSURE: 138 MMHG | WEIGHT: 232.4 LBS | HEART RATE: 81 BPM

## 2025-01-24 DIAGNOSIS — K59.09 OTHER CONSTIPATION: ICD-10-CM

## 2025-01-24 DIAGNOSIS — R10.10 UPPER ABDOMINAL PAIN: ICD-10-CM

## 2025-01-24 DIAGNOSIS — K31.89 DUODENAL NODULE: ICD-10-CM

## 2025-01-24 DIAGNOSIS — K80.20 CALCULUS OF GALLBLADDER WITHOUT CHOLECYSTITIS WITHOUT OBSTRUCTION: ICD-10-CM

## 2025-01-24 PROBLEM — 70342003: Status: ACTIVE | Noted: 2025-01-24

## 2025-01-24 PROCEDURE — 99214 OFFICE O/P EST MOD 30 MIN: CPT

## 2025-01-24 RX ORDER — METOCLOPRAMIDE 10 MG/1
1 TABLET BEFORE MEALS TABLET ORAL TWICE A DAY
Status: ACTIVE | COMMUNITY

## 2025-01-24 RX ORDER — ASPIRIN 81 MG/1
TABLET, DELAYED RELEASE ORAL TAKE AS DIRECTED
Refills: 0 | Status: ACTIVE | COMMUNITY
Start: 2019-10-16

## 2025-01-24 RX ORDER — TAMSULOSIN HYDROCHLORIDE 0.4 MG/1
1 CAPSULE CAPSULE ORAL ONCE A DAY
Status: ACTIVE | COMMUNITY

## 2025-01-24 RX ORDER — CETIRIZINE HYDROCHLORIDE 10 MG/1
1 TABLET TABLET, FILM COATED ORAL ONCE A DAY
Status: ACTIVE | COMMUNITY

## 2025-01-24 RX ORDER — LIDOCAINE 50 MG/G
1 APPLICATION AS NEEDED OINTMENT TOPICAL THREE TIMES A DAY
Status: ACTIVE | COMMUNITY

## 2025-01-24 RX ORDER — TRAZODONE HYDROCHLORIDE 50 MG/1
1 TABLET AT BEDTIME AS NEEDED TABLET ORAL ONCE A DAY
Status: ACTIVE | COMMUNITY

## 2025-01-24 RX ORDER — GABAPENTIN 300 MG/1
1 CAPSULE CAPSULE ORAL ONCE A DAY
Status: ACTIVE | COMMUNITY

## 2025-01-24 RX ORDER — OMEPRAZOLE 40 MG/1
1 CAPSULE 30 MINUTES BEFORE MORNING MEAL 1 CAPSULE 30MIN BEFORE DINNER CAPSULE, DELAYED RELEASE ORAL
Qty: 90 | Refills: 3 | Status: ACTIVE | COMMUNITY
Start: 2024-01-08

## 2025-01-24 RX ORDER — FLUOXETINE 20 MG/1
1 CAPSULE CAPSULE ORAL ONCE A DAY
Status: ACTIVE | COMMUNITY

## 2025-01-24 RX ORDER — INSULIN GLARGINE 100 [IU]/ML
INJECTION, SOLUTION SUBCUTANEOUS TAKE AS DIRECTED
Refills: 0 | Status: ACTIVE | COMMUNITY
Start: 2019-10-16

## 2025-01-24 RX ORDER — ATORVASTATIN CALCIUM 80 MG/1
1 TABLET TABLET, FILM COATED ORAL ONCE A DAY
Status: ACTIVE | COMMUNITY

## 2025-01-24 RX ORDER — DICYCLOMINE HYDROCHLORIDE 10 MG/1
CAPSULE ORAL
Qty: 60 CAPSULE | Status: ACTIVE | COMMUNITY

## 2025-01-24 RX ORDER — AMLODIPINE BESYLATE 5 MG/1
1 TABLET TABLET ORAL ONCE A DAY
Status: ACTIVE | COMMUNITY

## 2025-01-24 RX ORDER — METOPROLOL TARTRATE 25 MG/1
1 TABLET WITH FOOD TABLET, FILM COATED ORAL TWICE A DAY
Status: ACTIVE | COMMUNITY

## 2025-01-24 RX ORDER — HUMAN INSULIN 100 [USP'U]/ML
INJECTION, SUSPENSION SUBCUTANEOUS TAKE AS DIRECTED
Refills: 0 | Status: ACTIVE | COMMUNITY
Start: 2019-10-16

## 2025-01-24 RX ORDER — POLYETHYLENE GLYCOL 3350 17 G/17G
1 PACKET MIXED WITH 8 OUNCES OF FLUID POWDER, FOR SOLUTION ORAL ONCE A DAY
Status: ACTIVE | COMMUNITY

## 2025-01-24 RX ORDER — LOSARTAN POTASSIUM 25 MG/1
1 TABLET TABLET ORAL ONCE A DAY
Status: ACTIVE | COMMUNITY

## 2025-01-24 NOTE — HPI-PREVIOUS IMAGING
Gastric emptying study 1/3/2025 with 96% 1 hour-rapid gastric emptying.  CT virtual colonography dated 12/19/2022 showed a 5 mm polyp in the sigmoid colon and transverse colon. No additional polyp stricture or mass. Colon is tortuous. Recommend CT colonoscopy in 3 years. Cholelithiasis.  Barium swallow dated 11/16/2023 showed mild esophageal dysmotility  FibroScan dated 10/13/2022 showed S2 and F0  FibroScan performed with Dr. Grace dated 3/28/2023 showed CAP score 291 and 8.0 kPa. Concerning for fatty liver. Recommended FibroScan 12 months from prior study. Advised of lifestyle modifications. Does not currently qualify for clinical trial therapy. Normal LFTs.  CT abdomen/pelvis with contrast dated 6/1/2022 showed diffuse hepatic steatosis. Postsurgical changes of distal pancreatectomy. Pancreatic head and uncinate process have normal morphology. Simple subcentimeter right renal cysts which are not needed to follow-up

## 2025-01-24 NOTE — HPI-PREVIOUS PROCEDURES
EGD/EUS dated 1/8/2024. 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 Erosive gastritis with a clean-based antral ulcer measuring 6 mm in size. Abnormal appearing mucosa in the cardia. Small to moderate size hiatal hernia. Widely patent Schatzki's ring. Distal esophageal bulge of doubtful concern. Dilation was performed with a 50 Latvian Rodriguez dilator. Patient was placed on omeprazole 40 mg twice daily with intentions of ordering a CT scan of the abdomen and chest to evaluate the duodenal nodule and for any variceal disease. Antral mucosa with reactive changes no evidence of H. pylori. Body and antral type gastric mucosa without significant histopathologic changes. Cardia biopsy showed body type gastric mucosa. Distal esophageal biopsy showed squamous esophageal mucosa with mild spongiosis, reactive changes and reactive lymphocytosis  EGD dated 9/20/2019 demonstrated a regular Z line. A single sessile polyp was found and biopsied at the gastroesophageal junction. A small hiatal hernia was present. Inflammation was found in the gastric antrum. The remaining portion of the exam was otherwise unremarkable. Recommended EUS for further evaluation of duodenal nodule measuring 7 mm in the second portion of the duodenum. Duodenitis in the second portion of the duodenum. Pathology demonstrated benign mucosa demonstrating reactive changes with prominent underlying adipose tissue no definitive dysplasia or malignancy. Benign mucosa with areas demonstrating reactive changes but no evidence of celiac disease. Chronic gastritis and reactive changes negative for H. pylori. GE junction mucosa benign. No intestinal metaplasia.   Colonoscopy dated 9/20/2019 demonstrated one diminutive tubular adenoma removed from the mid sigmoid colon. One diminutive tubular adenoma was removed from the mid transverse colon. One hyperplastic polyp removed from rectum. Nonbleeding internal hemorrhoids were present upon retroflexion and the remaining portion of the exam was otherwise unremarkable. In light of patient's extremely difficult colonoscopy may consider virtual colonography when patient is due again for his surveillance modality.

## 2025-01-24 NOTE — HPI-TODAY'S VISIT:
Patient coming in following up after gastric emptying study.  We reviewed the results of today's visit.  Gastric emptying study showed rapid gastric emptying.  This would correspond with patient's symptoms.  He is unsure if has been taking reglan as it is noted on his medication list from last OV but patient does not know if he is currently taking it. He states he will have to go home and check. He reports he will eat and typically 30 minutes later he will get abdominal cramping and feel the urge to use the bathroom.  Does not happen after every meal only randomly.  Associated symptoms of abdominal bloating.  History of chronic constipation typically having a bowel movement every 2 to 3 days.  Taking MiraLAX as needed.  He has an EGD scheduled in March for further evaluation of his upper abdominal pain.  History of cholelithiasis. Met with a general surgeon 5 or more years prior who didn't recommend surgery as patient was not symptomatic at the time.  Last OV 12/2024: Michael is a pleasant 73-year-old male who presents today for a complaint of abdominal pain. Previous Dr. Diop patient. 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  Postop complicated by pseudocyst. Last visit noted bowel regularity had improved.  Noted occasional rectal bleeding when straining.   Patient coming in accompanied by his wife complaining of postprandial epigastric/umbilical pain that has been ongoing for the past 2 years or so.  Denies nausea, vomiting, fever, chills.  Small amount of reflux that he feels splashing on his esophagus.  Occasional issue with food feeling like it is "slowly going down".  Bowel movements tend to be on the more constipated side.  Patient usually having a bowel movement every other day but feels its incomplete at times with pushing and straining.  Currently taking Metamucil once a day and MiraLAX as needed.  Previous EGD dictated below did show antral ulcer 6 mm in size.  Denies any signs of GI bleeding, unintentional weight loss or changes in stool.

## 2025-02-20 ENCOUNTER — TELEPHONE ENCOUNTER (OUTPATIENT)
Dept: URBAN - METROPOLITAN AREA CLINIC 60 | Facility: CLINIC | Age: 75
End: 2025-02-20

## 2025-03-18 ENCOUNTER — TELEPHONE ENCOUNTER (OUTPATIENT)
Dept: URBAN - METROPOLITAN AREA CLINIC 63 | Facility: CLINIC | Age: 75
End: 2025-03-18

## 2025-03-26 ENCOUNTER — CLAIMS CREATED FROM THE CLAIM WINDOW (OUTPATIENT)
Dept: URBAN - METROPOLITAN AREA SURGERY CENTER 4 | Facility: SURGERY CENTER | Age: 75
End: 2025-03-26

## 2025-03-26 ENCOUNTER — OFFICE VISIT (OUTPATIENT)
Dept: URBAN - METROPOLITAN AREA SURGERY CENTER 4 | Facility: SURGERY CENTER | Age: 75
End: 2025-03-26
Payer: OTHER GOVERNMENT

## 2025-03-26 DIAGNOSIS — K31.89 OTHER DISEASES OF STOMACH AND DUODENUM: ICD-10-CM

## 2025-03-26 DIAGNOSIS — K44.9 DIAPHRAGMATIC HERNIA WITHOUT OBSTRUCTION OR GANGRENE: ICD-10-CM

## 2025-03-26 DIAGNOSIS — K31.7 POLYP OF STOMACH AND DUODENUM: ICD-10-CM

## 2025-03-26 DIAGNOSIS — K22.81 ESOPHAGEAL POLYP: ICD-10-CM

## 2025-03-26 PROCEDURE — 43239 EGD BIOPSY SINGLE/MULTIPLE: CPT | Performed by: INTERNAL MEDICINE

## 2025-03-26 RX ORDER — ASPIRIN 81 MG/1
TABLET, DELAYED RELEASE ORAL TAKE AS DIRECTED
Refills: 0 | Status: ACTIVE | COMMUNITY
Start: 2019-10-16

## 2025-03-26 RX ORDER — DICYCLOMINE HYDROCHLORIDE 10 MG/1
CAPSULE ORAL
Qty: 60 CAPSULE | Status: ACTIVE | COMMUNITY

## 2025-03-26 RX ORDER — HUMAN INSULIN 100 [USP'U]/ML
INJECTION, SUSPENSION SUBCUTANEOUS TAKE AS DIRECTED
Refills: 0 | Status: ACTIVE | COMMUNITY
Start: 2019-10-16

## 2025-03-26 RX ORDER — AMLODIPINE BESYLATE 5 MG/1
1 TABLET TABLET ORAL ONCE A DAY
Status: ACTIVE | COMMUNITY

## 2025-03-26 RX ORDER — GABAPENTIN 300 MG/1
1 CAPSULE CAPSULE ORAL ONCE A DAY
Status: ACTIVE | COMMUNITY

## 2025-03-26 RX ORDER — LIDOCAINE 50 MG/G
1 APPLICATION AS NEEDED OINTMENT TOPICAL THREE TIMES A DAY
Status: ACTIVE | COMMUNITY

## 2025-03-26 RX ORDER — INSULIN GLARGINE 100 [IU]/ML
INJECTION, SOLUTION SUBCUTANEOUS TAKE AS DIRECTED
Refills: 0 | Status: ACTIVE | COMMUNITY
Start: 2019-10-16

## 2025-03-26 RX ORDER — OMEPRAZOLE 40 MG/1
1 CAPSULE 30 MINUTES BEFORE MORNING MEAL 1 CAPSULE 30MIN BEFORE DINNER CAPSULE, DELAYED RELEASE ORAL
Qty: 90 | Refills: 3 | Status: ACTIVE | COMMUNITY
Start: 2024-01-08

## 2025-03-26 RX ORDER — TRAZODONE HYDROCHLORIDE 50 MG/1
1 TABLET AT BEDTIME AS NEEDED TABLET ORAL ONCE A DAY
Status: ACTIVE | COMMUNITY

## 2025-03-26 RX ORDER — LOSARTAN POTASSIUM 25 MG/1
1 TABLET TABLET ORAL ONCE A DAY
Status: ACTIVE | COMMUNITY

## 2025-03-26 RX ORDER — CETIRIZINE HYDROCHLORIDE 10 MG/1
1 TABLET TABLET, FILM COATED ORAL ONCE A DAY
Status: ACTIVE | COMMUNITY

## 2025-03-26 RX ORDER — METOCLOPRAMIDE 10 MG/1
1 TABLET BEFORE MEALS TABLET ORAL TWICE A DAY
Status: ACTIVE | COMMUNITY

## 2025-03-26 RX ORDER — FLUOXETINE 20 MG/1
1 CAPSULE CAPSULE ORAL ONCE A DAY
Status: ACTIVE | COMMUNITY

## 2025-03-26 RX ORDER — ATORVASTATIN CALCIUM 80 MG/1
1 TABLET TABLET, FILM COATED ORAL ONCE A DAY
Status: ACTIVE | COMMUNITY

## 2025-03-26 RX ORDER — METOPROLOL TARTRATE 25 MG/1
1 TABLET WITH FOOD TABLET, FILM COATED ORAL TWICE A DAY
Status: ACTIVE | COMMUNITY

## 2025-03-26 RX ORDER — TAMSULOSIN HYDROCHLORIDE 0.4 MG/1
1 CAPSULE CAPSULE ORAL ONCE A DAY
Status: ACTIVE | COMMUNITY

## 2025-03-26 RX ORDER — POLYETHYLENE GLYCOL 3350 17 G/17G
1 PACKET MIXED WITH 8 OUNCES OF FLUID POWDER, FOR SOLUTION ORAL ONCE A DAY
Status: ACTIVE | COMMUNITY

## 2025-04-08 ENCOUNTER — OFFICE VISIT (OUTPATIENT)
Dept: URBAN - METROPOLITAN AREA CLINIC 60 | Facility: CLINIC | Age: 75
End: 2025-04-08
Payer: OTHER GOVERNMENT

## 2025-04-08 DIAGNOSIS — K80.20 CALCULUS OF GALLBLADDER WITHOUT CHOLECYSTITIS WITHOUT OBSTRUCTION: ICD-10-CM

## 2025-04-08 DIAGNOSIS — K76.0 FATTY LIVER: ICD-10-CM

## 2025-04-08 DIAGNOSIS — K29.60 REFLUX GASTRITIS: ICD-10-CM

## 2025-04-08 DIAGNOSIS — K31.89 DUODENAL NODULE: ICD-10-CM

## 2025-04-08 DIAGNOSIS — K59.09 OTHER CONSTIPATION: ICD-10-CM

## 2025-04-08 PROBLEM — 72950008: Status: ACTIVE | Noted: 2025-04-08

## 2025-04-08 PROCEDURE — 99214 OFFICE O/P EST MOD 30 MIN: CPT

## 2025-04-08 RX ORDER — INSULIN GLARGINE 100 [IU]/ML
INJECTION, SOLUTION SUBCUTANEOUS TAKE AS DIRECTED
Refills: 0 | Status: ACTIVE | COMMUNITY
Start: 2019-10-16

## 2025-04-08 RX ORDER — CETIRIZINE HYDROCHLORIDE 10 MG/1
1 TABLET TABLET, FILM COATED ORAL ONCE A DAY
Status: ACTIVE | COMMUNITY

## 2025-04-08 RX ORDER — FLUOXETINE 20 MG/1
1 CAPSULE CAPSULE ORAL ONCE A DAY
Status: ACTIVE | COMMUNITY

## 2025-04-08 RX ORDER — TAMSULOSIN HYDROCHLORIDE 0.4 MG/1
1 CAPSULE CAPSULE ORAL ONCE A DAY
Status: ACTIVE | COMMUNITY

## 2025-04-08 RX ORDER — ATORVASTATIN CALCIUM 80 MG/1
1 TABLET TABLET, FILM COATED ORAL ONCE A DAY
Status: ACTIVE | COMMUNITY

## 2025-04-08 RX ORDER — LIDOCAINE 50 MG/G
1 APPLICATION AS NEEDED OINTMENT TOPICAL THREE TIMES A DAY
Status: ACTIVE | COMMUNITY

## 2025-04-08 RX ORDER — HUMAN INSULIN 100 [USP'U]/ML
INJECTION, SUSPENSION SUBCUTANEOUS TAKE AS DIRECTED
Refills: 0 | Status: ACTIVE | COMMUNITY
Start: 2019-10-16

## 2025-04-08 RX ORDER — ASPIRIN 81 MG/1
TABLET, DELAYED RELEASE ORAL TAKE AS DIRECTED
Refills: 0 | Status: ACTIVE | COMMUNITY
Start: 2019-10-16

## 2025-04-08 RX ORDER — POLYETHYLENE GLYCOL 3350 17 G/17G
1 PACKET MIXED WITH 8 OUNCES OF FLUID POWDER, FOR SOLUTION ORAL ONCE A DAY
Status: ACTIVE | COMMUNITY

## 2025-04-08 RX ORDER — OMEPRAZOLE 40 MG/1
1 CAPSULE 30 MINUTES BEFORE MORNING MEAL 1 CAPSULE 30MIN BEFORE DINNER CAPSULE, DELAYED RELEASE ORAL
Qty: 90 | Refills: 3 | Status: ACTIVE | COMMUNITY
Start: 2024-01-08

## 2025-04-08 RX ORDER — LOSARTAN POTASSIUM 25 MG/1
1 TABLET TABLET ORAL ONCE A DAY
Status: ACTIVE | COMMUNITY

## 2025-04-08 RX ORDER — DICYCLOMINE HYDROCHLORIDE 10 MG/1
CAPSULE ORAL
Qty: 60 CAPSULE | Status: ACTIVE | COMMUNITY

## 2025-04-08 RX ORDER — TRAZODONE HYDROCHLORIDE 50 MG/1
1 TABLET AT BEDTIME AS NEEDED TABLET ORAL ONCE A DAY
Status: ACTIVE | COMMUNITY

## 2025-04-08 RX ORDER — METOPROLOL TARTRATE 25 MG/1
1 TABLET WITH FOOD TABLET, FILM COATED ORAL TWICE A DAY
Status: ACTIVE | COMMUNITY

## 2025-04-08 RX ORDER — AMLODIPINE BESYLATE 5 MG/1
1 TABLET TABLET ORAL ONCE A DAY
Status: ACTIVE | COMMUNITY

## 2025-04-08 RX ORDER — GABAPENTIN 300 MG/1
1 CAPSULE CAPSULE ORAL ONCE A DAY
Status: ACTIVE | COMMUNITY

## 2025-04-08 NOTE — HPI-TODAY'S VISIT:
Patient coming in, accompanied by his wife, to discuss EGD. Findings c/w gastritis and duodenitis with bx negative. US again showing cholelithiasis. We discuss another surgical referral. He was previously evaluated by gen surgery but does not recall who it was. They recommended conservative management at the time. Patient with same symptoms. Chronic constipation on Miralax, metamucil. Will start using prune juice.   Last OV 1/2025: Patient coming in following up after gastric emptying study.  We reviewed the results of today's visit.  Gastric emptying study showed rapid gastric emptying.  This would correspond with patient's symptoms.  He is unsure if has been taking reglan as it is noted on his medication list from last OV but patient does not know if he is currently taking it. He states he will have to go home and check. He reports he will eat and typically 30 minutes later he will get abdominal cramping and feel the urge to use the bathroom.  Does not happen after every meal only randomly.  Associated symptoms of abdominal bloating.  History of chronic constipation typically having a bowel movement every 2 to 3 days.  Taking MiraLAX as needed.  He has an EGD scheduled in March for further evaluation of his upper abdominal pain.  History of cholelithiasis. Met with a general surgeon 5 or more years prior who didn't recommend surgery as patient was not symptomatic at the time.  Last OV 12/2024: Michael is a pleasant 73-year-old male who presents today for a complaint of abdominal pain. Previous Dr. Diop patient. 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  Postop complicated by pseudocyst. Last visit noted bowel regularity had improved.  Noted occasional rectal bleeding when straining.   Patient coming in accompanied by his wife complaining of postprandial epigastric/umbilical pain that has been ongoing for the past 2 years or so.  Denies nausea, vomiting, fever, chills.  Small amount of reflux that he feels splashing on his esophagus.  Occasional issue with food feeling like it is "slowly going down".  Bowel movements tend to be on the more constipated side.  Patient usually having a bowel movement every other day but feels its incomplete at times with pushing and straining.  Currently taking Metamucil once a day and MiraLAX as needed.  Previous EGD dictated below did show antral ulcer 6 mm in size.  Denies any signs of GI bleeding, unintentional weight loss or changes in stool.

## 2025-04-08 NOTE — HPI-PREVIOUS PROCEDURES
EGD Dr. Alford 3/26/2025 with finding of esophageal polyp, normal GE junction, 2cm HH, erythematous mucosa in gastric antrum, single gastric polyp that was fundic gland, two duodenal polyps (negative bx), lipoma which was noted on previous EUS. Duodenal bx showing lipoma and bx showing peptic duodenitis. Stomach bx negative for h. pylori. Esophageal bx negative for IM.  EGD/EUS dated 1/8/2024. 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 Erosive gastritis with a clean-based antral ulcer measuring 6 mm in size. Abnormal appearing mucosa in the cardia. Small to moderate size hiatal hernia. Widely patent Schatzki's ring. Distal esophageal bulge of doubtful concern. Dilation was performed with a 50 [place] Rodriguez dilator. Patient was placed on omeprazole 40 mg twice daily with intentions of ordering a CT scan of the abdomen and chest to evaluate the duodenal nodule and for any variceal disease. Antral mucosa with reactive changes no evidence of H. pylori. Body and antral type gastric mucosa without significant histopathologic changes. Cardia biopsy showed body type gastric mucosa. Distal esophageal biopsy showed squamous esophageal mucosa with mild spongiosis, reactive changes and reactive lymphocytosis  EGD dated 9/20/2019 demonstrated a regular Z line. A single sessile polyp was found and biopsied at the gastroesophageal junction. A small hiatal hernia was present. Inflammation was found in the gastric antrum. The remaining portion of the exam was otherwise unremarkable. Recommended EUS for further evaluation of duodenal nodule measuring 7 mm in the second portion of the duodenum. Duodenitis in the second portion of the duodenum. Pathology demonstrated benign mucosa demonstrating reactive changes with prominent underlying adipose tissue no definitive dysplasia or malignancy. Benign mucosa with areas demonstrating reactive changes but no evidence of celiac disease. Chronic gastritis and reactive changes negative for H. pylori. GE junction mucosa benign. No intestinal metaplasia.   Colonoscopy dated 9/20/2019 demonstrated one diminutive tubular adenoma removed from the mid sigmoid colon. One diminutive tubular adenoma was removed from the mid transverse colon. One hyperplastic polyp removed from rectum. Nonbleeding internal hemorrhoids were present upon retroflexion and the remaining portion of the exam was otherwise unremarkable. In light of patient's extremely difficult colonoscopy may consider virtual colonography when patient is due again for his surveillance modality.

## 2025-04-10 ENCOUNTER — TELEPHONE ENCOUNTER (OUTPATIENT)
Dept: URBAN - METROPOLITAN AREA CLINIC 60 | Facility: CLINIC | Age: 75
End: 2025-04-10